# Patient Record
Sex: FEMALE | Race: WHITE | NOT HISPANIC OR LATINO
[De-identification: names, ages, dates, MRNs, and addresses within clinical notes are randomized per-mention and may not be internally consistent; named-entity substitution may affect disease eponyms.]

---

## 2017-12-29 ENCOUNTER — RX ONLY (RX ONLY)
Age: 72
End: 2017-12-29

## 2017-12-29 ENCOUNTER — APPOINTMENT (OUTPATIENT)
Dept: URBAN - METROPOLITAN AREA CLINIC 200 | Age: 72
Setting detail: DERMATOLOGY
End: 2017-12-29

## 2017-12-29 DIAGNOSIS — D22 MELANOCYTIC NEVI: ICD-10-CM

## 2017-12-29 DIAGNOSIS — L57.8 OTHER SKIN CHANGES DUE TO CHRONIC EXPOSURE TO NONIONIZING RADIATION: ICD-10-CM

## 2017-12-29 DIAGNOSIS — L40.0 PSORIASIS VULGARIS: ICD-10-CM

## 2017-12-29 PROBLEM — D22.5 MELANOCYTIC NEVI OF TRUNK: Status: ACTIVE | Noted: 2017-12-29

## 2017-12-29 PROCEDURE — OTHER COUNSELING: OTHER

## 2017-12-29 PROCEDURE — 99213 OFFICE O/P EST LOW 20 MIN: CPT

## 2017-12-29 PROCEDURE — OTHER PRESCRIPTION MEDICATION MANAGEMENT: OTHER

## 2017-12-29 RX ORDER — HALOBETASOL PROPIONATE 0.5 MG/G
CREAM TOPICAL
Qty: 1 | Refills: 6 | Status: ERX

## 2017-12-29 ASSESSMENT — LOCATION SIMPLE DESCRIPTION DERM
LOCATION SIMPLE: RIGHT ELBOW
LOCATION SIMPLE: CHEST

## 2017-12-29 ASSESSMENT — LOCATION DETAILED DESCRIPTION DERM
LOCATION DETAILED: UPPER STERNUM
LOCATION DETAILED: RIGHT ELBOW

## 2017-12-29 ASSESSMENT — LOCATION ZONE DERM
LOCATION ZONE: ARM
LOCATION ZONE: TRUNK

## 2019-01-10 ENCOUNTER — APPOINTMENT (OUTPATIENT)
Dept: URBAN - METROPOLITAN AREA CLINIC 200 | Age: 74
Setting detail: DERMATOLOGY
End: 2019-01-11

## 2019-01-10 DIAGNOSIS — L82.1 OTHER SEBORRHEIC KERATOSIS: ICD-10-CM

## 2019-01-10 DIAGNOSIS — L57.8 OTHER SKIN CHANGES DUE TO CHRONIC EXPOSURE TO NONIONIZING RADIATION: ICD-10-CM

## 2019-01-10 DIAGNOSIS — L20.84 INTRINSIC (ALLERGIC) ECZEMA: ICD-10-CM

## 2019-01-10 DIAGNOSIS — D18.0 HEMANGIOMA: ICD-10-CM

## 2019-01-10 PROBLEM — D18.01 HEMANGIOMA OF SKIN AND SUBCUTANEOUS TISSUE: Status: ACTIVE | Noted: 2019-01-10

## 2019-01-10 PROBLEM — J30.1 ALLERGIC RHINITIS DUE TO POLLEN: Status: ACTIVE | Noted: 2019-01-10

## 2019-01-10 PROCEDURE — OTHER MIPS QUALITY: OTHER

## 2019-01-10 PROCEDURE — 99213 OFFICE O/P EST LOW 20 MIN: CPT

## 2019-01-10 PROCEDURE — OTHER PRESCRIPTION: OTHER

## 2019-01-10 PROCEDURE — OTHER COUNSELING: OTHER

## 2019-01-10 PROCEDURE — OTHER REASSURANCE: OTHER

## 2019-01-10 RX ORDER — TRIAMCINOLONE ACETONIDE 1 MG/G
CREAM TOPICAL BID
Qty: 1 | Refills: 8 | Status: ERX

## 2019-01-10 ASSESSMENT — LOCATION SIMPLE DESCRIPTION DERM
LOCATION SIMPLE: ABDOMEN
LOCATION SIMPLE: RIGHT LOWER BACK
LOCATION SIMPLE: CHEST
LOCATION SIMPLE: LEFT BREAST

## 2019-01-10 ASSESSMENT — LOCATION DETAILED DESCRIPTION DERM
LOCATION DETAILED: RIGHT RIB CAGE
LOCATION DETAILED: PERIUMBILICAL SKIN
LOCATION DETAILED: LEFT MEDIAL BREAST 11-12:00 REGION
LOCATION DETAILED: RIGHT SUPERIOR MEDIAL MIDBACK
LOCATION DETAILED: LEFT MEDIAL SUPERIOR CHEST

## 2019-01-10 ASSESSMENT — SEVERITY ASSESSMENT: SEVERITY: MILD

## 2019-01-10 ASSESSMENT — LOCATION ZONE DERM: LOCATION ZONE: TRUNK

## 2019-01-10 ASSESSMENT — BSA RASH: BSA RASH: 5

## 2020-02-11 ENCOUNTER — APPOINTMENT (OUTPATIENT)
Dept: URBAN - METROPOLITAN AREA CLINIC 200 | Age: 75
Setting detail: DERMATOLOGY
End: 2020-02-21

## 2020-02-11 DIAGNOSIS — L84 CORNS AND CALLOSITIES: ICD-10-CM

## 2020-02-11 DIAGNOSIS — L90.5 SCAR CONDITIONS AND FIBROSIS OF SKIN: ICD-10-CM

## 2020-02-11 DIAGNOSIS — L20.84 INTRINSIC (ALLERGIC) ECZEMA: ICD-10-CM

## 2020-02-11 DIAGNOSIS — L57.8 OTHER SKIN CHANGES DUE TO CHRONIC EXPOSURE TO NONIONIZING RADIATION: ICD-10-CM

## 2020-02-11 PROCEDURE — OTHER MIPS QUALITY: OTHER

## 2020-02-11 PROCEDURE — OTHER PRESCRIPTION MEDICATION MANAGEMENT: OTHER

## 2020-02-11 PROCEDURE — 99213 OFFICE O/P EST LOW 20 MIN: CPT

## 2020-02-11 PROCEDURE — OTHER PRESCRIPTION SAMPLES PROVIDED: OTHER

## 2020-02-11 PROCEDURE — OTHER COUNSELING: OTHER

## 2020-02-11 PROCEDURE — OTHER REASSURANCE: OTHER

## 2020-02-11 ASSESSMENT — LOCATION DETAILED DESCRIPTION DERM
LOCATION DETAILED: RIGHT ANTERIOR PROXIMAL UPPER ARM
LOCATION DETAILED: LEFT LATERAL ABDOMEN
LOCATION DETAILED: LEFT VENTRAL DISTAL FOREARM
LOCATION DETAILED: LEFT LATERAL DORSAL WRIST
LOCATION DETAILED: RIGHT ELBOW
LOCATION DETAILED: LEFT ELBOW
LOCATION DETAILED: LEFT MEDIAL SUPERIOR CHEST
LOCATION DETAILED: INFERIOR THORACIC SPINE
LOCATION DETAILED: LEFT ANTERIOR PROXIMAL UPPER ARM
LOCATION DETAILED: LEFT GREAT TOE
LOCATION DETAILED: RIGHT VENTRAL PROXIMAL FOREARM

## 2020-02-11 ASSESSMENT — LOCATION SIMPLE DESCRIPTION DERM
LOCATION SIMPLE: ABDOMEN
LOCATION SIMPLE: LEFT ELBOW
LOCATION SIMPLE: RIGHT FOREARM
LOCATION SIMPLE: RIGHT ELBOW
LOCATION SIMPLE: UPPER BACK
LOCATION SIMPLE: LEFT GREAT TOE
LOCATION SIMPLE: RIGHT UPPER ARM
LOCATION SIMPLE: LEFT FOREARM
LOCATION SIMPLE: LEFT WRIST
LOCATION SIMPLE: LEFT UPPER ARM
LOCATION SIMPLE: CHEST

## 2020-02-11 ASSESSMENT — LOCATION ZONE DERM
LOCATION ZONE: ARM
LOCATION ZONE: TRUNK
LOCATION ZONE: TOE

## 2020-02-11 NOTE — PROCEDURE: REASSURANCE
Additional Notes (Optional): Recommended orthopedic surgeon
Hide Additional Notes?: No
Detail Level: Detailed

## 2020-02-11 NOTE — PROCEDURE: PRESCRIPTION MEDICATION MANAGEMENT
Detail Level: Detailed
Continue Regimen: Triamcinolone cream to eczema on body
Render In Strict Bullet Format?: No

## 2021-03-25 ENCOUNTER — RX ONLY (RX ONLY)
Age: 76
End: 2021-03-25

## 2021-03-25 ENCOUNTER — APPOINTMENT (OUTPATIENT)
Dept: URBAN - METROPOLITAN AREA CLINIC 200 | Age: 76
Setting detail: DERMATOLOGY
End: 2021-04-01

## 2021-03-25 DIAGNOSIS — L20.84 INTRINSIC (ALLERGIC) ECZEMA: ICD-10-CM

## 2021-03-25 DIAGNOSIS — L57.8 OTHER SKIN CHANGES DUE TO CHRONIC EXPOSURE TO NONIONIZING RADIATION: ICD-10-CM

## 2021-03-25 PROCEDURE — OTHER MIPS QUALITY: OTHER

## 2021-03-25 PROCEDURE — OTHER PRESCRIPTION MEDICATION MANAGEMENT: OTHER

## 2021-03-25 PROCEDURE — 99213 OFFICE O/P EST LOW 20 MIN: CPT

## 2021-03-25 PROCEDURE — OTHER COUNSELING: OTHER

## 2021-03-25 RX ORDER — HALOBETASOL PROPIONATE 0.5 MG/G
CREAM TOPICAL
Qty: 1 | Refills: 6 | Status: ERX | COMMUNITY
Start: 2021-03-25

## 2021-03-25 ASSESSMENT — LOCATION DETAILED DESCRIPTION DERM
LOCATION DETAILED: LEFT MEDIAL SUPERIOR CHEST
LOCATION DETAILED: RIGHT DISTAL LATERAL POSTERIOR UPPER ARM
LOCATION DETAILED: RIGHT PROXIMAL DORSAL FOREARM
LOCATION DETAILED: LEFT PROXIMAL DORSAL FOREARM

## 2021-03-25 ASSESSMENT — SEVERITY ASSESSMENT 2020: SEVERITY 2020: MILD

## 2021-03-25 ASSESSMENT — LOCATION SIMPLE DESCRIPTION DERM
LOCATION SIMPLE: CHEST
LOCATION SIMPLE: RIGHT FOREARM
LOCATION SIMPLE: RIGHT POSTERIOR UPPER ARM
LOCATION SIMPLE: LEFT FOREARM

## 2021-03-25 ASSESSMENT — LOCATION ZONE DERM
LOCATION ZONE: TRUNK
LOCATION ZONE: ARM

## 2021-06-24 ENCOUNTER — APPOINTMENT (OUTPATIENT)
Dept: URBAN - METROPOLITAN AREA CLINIC 200 | Age: 76
Setting detail: DERMATOLOGY
End: 2021-06-28

## 2021-06-24 DIAGNOSIS — L82.1 OTHER SEBORRHEIC KERATOSIS: ICD-10-CM

## 2021-06-24 PROCEDURE — OTHER REASSURANCE: OTHER

## 2021-06-24 PROCEDURE — OTHER COUNSELING: OTHER

## 2021-06-24 PROCEDURE — 99212 OFFICE O/P EST SF 10 MIN: CPT

## 2021-06-24 ASSESSMENT — LOCATION SIMPLE DESCRIPTION DERM
LOCATION SIMPLE: RIGHT UPPER BACK
LOCATION SIMPLE: RIGHT LOWER BACK

## 2021-06-24 ASSESSMENT — LOCATION DETAILED DESCRIPTION DERM
LOCATION DETAILED: RIGHT INFERIOR MEDIAL UPPER BACK
LOCATION DETAILED: RIGHT SUPERIOR MEDIAL MIDBACK

## 2021-06-24 ASSESSMENT — LOCATION ZONE DERM: LOCATION ZONE: TRUNK

## 2022-04-12 ENCOUNTER — APPOINTMENT (OUTPATIENT)
Dept: URBAN - METROPOLITAN AREA CLINIC 200 | Age: 77
Setting detail: DERMATOLOGY
End: 2022-04-20

## 2022-04-12 DIAGNOSIS — L57.8 OTHER SKIN CHANGES DUE TO CHRONIC EXPOSURE TO NONIONIZING RADIATION: ICD-10-CM

## 2022-04-12 DIAGNOSIS — L57.0 ACTINIC KERATOSIS: ICD-10-CM

## 2022-04-12 DIAGNOSIS — Z11.52 ENCOUNTER FOR SCREENING FOR COVID-19: ICD-10-CM

## 2022-04-12 PROBLEM — E03.9 HYPOTHYROIDISM, UNSPECIFIED: Status: ACTIVE | Noted: 2022-04-12

## 2022-04-12 PROCEDURE — 17000 DESTRUCT PREMALG LESION: CPT

## 2022-04-12 PROCEDURE — OTHER COUNSELING: OTHER

## 2022-04-12 PROCEDURE — OTHER SUNSCREEN RECOMMENDATIONS: OTHER

## 2022-04-12 PROCEDURE — OTHER SCREENING FOR COVID-19: OTHER

## 2022-04-12 PROCEDURE — OTHER LIQUID NITROGEN: OTHER

## 2022-04-12 PROCEDURE — 99213 OFFICE O/P EST LOW 20 MIN: CPT | Mod: 25

## 2022-04-12 ASSESSMENT — LOCATION SIMPLE DESCRIPTION DERM
LOCATION SIMPLE: LEFT UPPER ARM
LOCATION SIMPLE: ABDOMEN

## 2022-04-12 ASSESSMENT — LOCATION ZONE DERM
LOCATION ZONE: ARM
LOCATION ZONE: TRUNK

## 2022-04-12 ASSESSMENT — PAIN INTENSITY VAS: HOW INTENSE IS YOUR PAIN 0 BEING NO PAIN, 10 BEING THE MOST SEVERE PAIN POSSIBLE?: NO PAIN

## 2022-04-12 ASSESSMENT — LOCATION DETAILED DESCRIPTION DERM
LOCATION DETAILED: PERIUMBILICAL SKIN
LOCATION DETAILED: LEFT LATERAL PROXIMAL UPPER ARM

## 2022-04-12 NOTE — PROCEDURE: LIQUID NITROGEN
Show Aperture Variable?: Yes
Consent: The patient's consent was obtained including but not limited to risks of crusting, scabbing, blistering, scarring, darker or lighter pigmentary change, recurrence, incomplete removal and infection.
Post-Care Instructions: I reviewed with the patient in detail post-care instructions. Patient is to wear sunprotection, and avoid picking at any of the treated lesions. Pt may apply Vaseline to crusted or scabbing areas.
Number Of Freeze-Thaw Cycles: 1 freeze-thaw cycle
Duration Of Freeze Thaw-Cycle (Seconds): 1
Render Note In Bullet Format When Appropriate: No
Detail Level: Detailed

## 2022-11-17 NOTE — HPI: EVALUATION OF SKIN LESION(S)
Hpi Title: Evaluation of Skin Lesions
S/p CABG in 2006 and PCI in 2013  - last LHC was in 2018, showed stable disease. There was no intervention indicated  - Per patient, last stress test 1 year ago was normal  - c/w home aspirin 81mg qPM  - c/w home simvastatin 40mg qPM  - c/w home metoprolol succinate 100mg daily  - pt takes olmesartan at home but was unsure of the dose. For now will c/w losartan 50mg daily  - please clarify home dose of olmesartan in the AM

## 2023-05-09 ENCOUNTER — APPOINTMENT (OUTPATIENT)
Dept: URBAN - METROPOLITAN AREA CLINIC 203 | Age: 78
Setting detail: DERMATOLOGY
End: 2023-05-16

## 2023-05-09 DIAGNOSIS — L57.8 OTHER SKIN CHANGES DUE TO CHRONIC EXPOSURE TO NONIONIZING RADIATION: ICD-10-CM

## 2023-05-09 DIAGNOSIS — L20.84 INTRINSIC (ALLERGIC) ECZEMA: ICD-10-CM

## 2023-05-09 PROCEDURE — OTHER SUNSCREEN RECOMMENDATIONS: OTHER

## 2023-05-09 PROCEDURE — OTHER PRESCRIPTION MEDICATION MANAGEMENT: OTHER

## 2023-05-09 PROCEDURE — OTHER COUNSELING: OTHER

## 2023-05-09 PROCEDURE — OTHER PRESCRIPTION: OTHER

## 2023-05-09 PROCEDURE — 99213 OFFICE O/P EST LOW 20 MIN: CPT

## 2023-05-09 RX ORDER — CLOBETASOL PROPIONATE 0.5 MG/G
CREAM TOPICAL BID
Qty: 15 | Refills: 3 | Status: ERX | COMMUNITY
Start: 2023-05-09

## 2023-05-09 ASSESSMENT — LOCATION DETAILED DESCRIPTION DERM
LOCATION DETAILED: LEFT MEDIAL BREAST 11-12:00 REGION
LOCATION DETAILED: SUPERIOR THORACIC SPINE
LOCATION DETAILED: LEFT MEDIAL BREAST 10-11:00 REGION

## 2023-05-09 ASSESSMENT — LOCATION ZONE DERM: LOCATION ZONE: TRUNK

## 2023-05-09 ASSESSMENT — LOCATION SIMPLE DESCRIPTION DERM
LOCATION SIMPLE: UPPER BACK
LOCATION SIMPLE: LEFT BREAST

## 2024-01-29 ENCOUNTER — TRANSCRIBE ORDERS (OUTPATIENT)
Dept: SCHEDULING | Age: 79
End: 2024-01-29

## 2024-01-29 DIAGNOSIS — M75.121 COMPLETE ROTATOR CUFF TEAR OR RUPTURE OF RIGHT SHOULDER, NOT SPECIFIED AS TRAUMATIC: ICD-10-CM

## 2024-01-29 DIAGNOSIS — M19.011 PRIMARY OSTEOARTHRITIS, RIGHT SHOULDER: Primary | ICD-10-CM

## 2024-02-12 ENCOUNTER — HOSPITAL ENCOUNTER (OUTPATIENT)
Dept: RADIOLOGY | Facility: HOSPITAL | Age: 79
Discharge: HOME | End: 2024-02-12
Attending: ORTHOPAEDIC SURGERY
Payer: MEDICARE

## 2024-02-12 DIAGNOSIS — M75.121 COMPLETE ROTATOR CUFF TEAR OR RUPTURE OF RIGHT SHOULDER, NOT SPECIFIED AS TRAUMATIC: ICD-10-CM

## 2024-02-12 DIAGNOSIS — M19.011 PRIMARY OSTEOARTHRITIS, RIGHT SHOULDER: ICD-10-CM

## 2024-03-12 ENCOUNTER — PRE-ADMISSION TESTING (OUTPATIENT)
Dept: PREADMISSION TESTING | Age: 79
End: 2024-03-12
Payer: MEDICARE

## 2024-03-12 VITALS — WEIGHT: 220 LBS | BODY MASS INDEX: 40.48 KG/M2 | HEIGHT: 62 IN

## 2024-03-12 RX ORDER — LEVOTHYROXINE SODIUM 175 UG/1
175 TABLET ORAL DAILY
COMMUNITY
Start: 2024-02-01

## 2024-03-12 RX ORDER — MINERAL OIL
180 ENEMA (ML) RECTAL DAILY
COMMUNITY

## 2024-03-12 RX ORDER — ESCITALOPRAM OXALATE 10 MG/1
10 TABLET ORAL DAILY
COMMUNITY
Start: 2024-02-17

## 2024-03-12 ASSESSMENT — PAIN SCALES - GENERAL: PAINLEVEL: 2

## 2024-03-12 NOTE — PRE-PROCEDURE INSTRUCTIONS
[facility]  Patient Education Preoperative Showers    Good hygiene, such as frequent handwashing and daily skin cleansing, promotes good health. Daily skin cleansing helps remove germs that may cause infections. The following instructions should be followed to help reduce germs on your skin prior to your surgical procedure.     • Bactoshield/Hibiclens CHG 4% is an antiseptic soap. The active ingredient is chlorhexidine gluconate. Do not use this product if you are allergic to chlorhexidine gluconate.  • The NIGHT before and the MORNING of your procedure, shower or bathe with Bactoshield. This product should replace your regular soap used for cleansing most of your body surfaces. Bactoshield should not be used on your head or face; keep out of your eyes, ears and mouth.  • If you plan to wash your hair, do so with your regular shampoo. Then, rinse the hair and your body thoroughly to remove any shampoo residue.  • Wash your face with regular soap and water only.  • Wash your genital area with soap and water only.  • Thoroughly rinse your body with warm water from the neck down.  • Apply the minimum amount of Bactoshield to cover the skin. Use this product as you would any liquid soap. Leave this on for 2 minutes. NOTE- Bactoshield DOES NOT LATHER like normal soap.   • Wash the skin gently and rinse thoroughly with warm water. You do not need to scrub the skin to remove germs.  • Do not use regular soap after you have applied and rinsed the Bactoshield.  Change into clean clothes after each shower.   • Do not apply any lotions, powders, or perfumes to the body areas that have been cleansed with Bactoshield.  • No use of hair removal products or shaving at or near the surgical site 48 hours before your procedure. (72 hours for cardiac patients.)  • For those having perineal surgeries (i.e.: vaginal, rectal or cystoscopy) - please use Dial soap.

## 2024-03-12 NOTE — PRE-PROCEDURE INSTRUCTIONS
1.       Admissions will call you with your arrival time on 3/26 (day prior to surgery) between 2pm-4pm.  For questions about your arrival time, please call [phone].    2.       On the day of your procedure [facility] [place]. Please arrive through the [facility].  If you are parking in the [facility], come to the ground floor of the garage and follow signs to the [facility].  After being screened, please report to either the Outpatient Registration for Pre-Admission Testing or to the Surgery Registration Desk.    3. Please follow the following fasting guidelines:     No solid food EIGHT HOURS prior to arrival time on day of surgery.  6 ounces of clear liquids, meaning water or PLAIN black coffee WITHOUT any milk, cream, sugar, or sweetener are permitted up to TWO HOURS prior to arrival at the hospital.    4. Please take ONLY the following medications with a sip of water on the morning of your procedure: (populate names and/or NONE)  Lexapro, Synthroid    5. Other Instructions: You may brush your teeth the morning of the procedure. Rinse and spit, do not swallow.  Bring a list of your medications with dosages.  Use surgical wash as directed.     6. If you develop a cold, cough, fever, rash, or other symptom prior to the day of the procedure, please report it to your physician immediately.    7. If you need to cancel the procedure for any reason, please contact your physician.    8. Make arrangements to have safe transportation home accompanied by a responsible adult. If you have not arranged safe transportation home, your surgery will be cancelled. Safe transportation may include private vehicle, ride-share service, taxi and public transportation when accompanied by a responsible adult who will assist you home. A responsible adult is someone known to you and does not include the taxi, ride-share or public transit drive transporting  you.    9.  If it is medically necessary for you to have a longer stay, you will be informed as soon as the decision is made.    10. Only bring essential items to the hospital.  Do not wear or bring anything of value to the hospital including jewelry of any kind, money, or wallet. Do not wear make-up or contact lenses.  DO NOT BRING MEDICATIONS FROM HOME unless instructed to do so. DO bring your hearing aids, glasses, and a case    11. No lotion, creams, powders, or oils on skin the morning of procedure     12. Dress in comfortable clothes.    13.  If instructed, please bring a copy of your Advanced Directive (Living Will/Durable Power of ) on the day of your procedure.     14. Ensuring your safety at all times is a very important part of our [facility] Culture of Safety. After having surgery and sedation, you are at risk for falling and balance issues. Although you may feel awake, the effects of the medication can last up to 24 hours after anesthesia. If you need to use the bathroom during your recovery period, nursing staff will escort you there and stay with you to ensure your safety.    15. Refrain from drinking alcohol and smoking cigarettes for 24 hours prior to surgery.    16. Shower with antibacterial soap (Dial) the night before and morning of your procedure.  If your procedure indicates the need for CHG antiseptic wash (Bactoshield or Hibiclens), please use this instead and follow instructions as discussed at the time of your Pre-Admission Testing phone interview or visit.    Above instructions reviewed with patient and patient acknowledges understanding.    Form explained by: Cary Mitchell RN

## 2024-03-15 ENCOUNTER — TRANSCRIBE ORDERS (OUTPATIENT)
Dept: LAB | Facility: HOSPITAL | Age: 79
End: 2024-03-15

## 2024-03-15 DIAGNOSIS — R94.120 ABNORMAL AUDITORY FUNCTION STUDY: ICD-10-CM

## 2024-03-15 DIAGNOSIS — M19.011 PRIMARY OSTEOARTHRITIS, RIGHT SHOULDER: ICD-10-CM

## 2024-03-15 DIAGNOSIS — M75.121 COMPLETE ROTATOR CUFF TEAR OR RUPTURE OF RIGHT SHOULDER, NOT SPECIFIED AS TRAUMATIC: Primary | ICD-10-CM

## 2024-03-18 ENCOUNTER — HOSPITAL ENCOUNTER (OUTPATIENT)
Dept: CARDIOLOGY | Facility: HOSPITAL | Age: 79
Discharge: HOME | End: 2024-03-18
Attending: ORTHOPAEDIC SURGERY
Payer: MEDICARE

## 2024-03-18 ENCOUNTER — PRE-ADMISSION TESTING (OUTPATIENT)
Dept: PREADMISSION TESTING | Facility: HOSPITAL | Age: 79
End: 2024-03-18
Attending: ORTHOPAEDIC SURGERY
Payer: MEDICARE

## 2024-03-18 VITALS
WEIGHT: 220 LBS | TEMPERATURE: 98.5 F | DIASTOLIC BLOOD PRESSURE: 72 MMHG | OXYGEN SATURATION: 96 % | SYSTOLIC BLOOD PRESSURE: 130 MMHG | HEART RATE: 68 BPM | RESPIRATION RATE: 16 BRPM | BODY MASS INDEX: 40.48 KG/M2 | HEIGHT: 62 IN

## 2024-03-18 DIAGNOSIS — R94.120 ABNORMAL AUDITORY FUNCTION STUDY: ICD-10-CM

## 2024-03-18 DIAGNOSIS — F41.9 ANXIETY: ICD-10-CM

## 2024-03-18 DIAGNOSIS — D64.9 ANEMIA, UNSPECIFIED TYPE: ICD-10-CM

## 2024-03-18 DIAGNOSIS — M75.121 COMPLETE ROTATOR CUFF TEAR OR RUPTURE OF RIGHT SHOULDER, NOT SPECIFIED AS TRAUMATIC: ICD-10-CM

## 2024-03-18 DIAGNOSIS — M19.011 PRIMARY OSTEOARTHRITIS, RIGHT SHOULDER: ICD-10-CM

## 2024-03-18 DIAGNOSIS — E03.9 HYPOTHYROIDISM, UNSPECIFIED TYPE: ICD-10-CM

## 2024-03-18 DIAGNOSIS — Z01.818 ENCOUNTER FOR PRE-OPERATIVE EXAMINATION: ICD-10-CM

## 2024-03-18 LAB
ABO + RH BLD: NORMAL
ANION GAP SERPL CALC-SCNC: 7 MEQ/L (ref 3–15)
ATRIAL RATE: 74
BLD GP AB SCN SERPL QL: NEGATIVE
BLOOD BANK CMNT PATIENT-IMP: NORMAL
BUN SERPL-MCNC: 18 MG/DL (ref 7–25)
CALCIUM SERPL-MCNC: 9.1 MG/DL (ref 8.6–10.3)
CHLORIDE SERPL-SCNC: 104 MEQ/L (ref 98–107)
CO2 SERPL-SCNC: 27 MEQ/L (ref 21–31)
CREAT SERPL-MCNC: 0.6 MG/DL (ref 0.6–1.2)
D AG BLD QL: POSITIVE
EGFRCR SERPLBLD CKD-EPI 2021: >60 ML/MIN/1.73M*2
ERYTHROCYTE [DISTWIDTH] IN BLOOD BY AUTOMATED COUNT: 15.6 % (ref 11.7–14.4)
GLUCOSE SERPL-MCNC: 94 MG/DL (ref 70–99)
HCT VFR BLD AUTO: 35 % (ref 35–45)
HGB BLD-MCNC: 11.1 G/DL (ref 11.8–15.7)
LABORATORY COMMENT REPORT: NORMAL
MCH RBC QN AUTO: 25.9 PG (ref 28–33.2)
MCHC RBC AUTO-ENTMCNC: 31.7 G/DL (ref 32.2–35.5)
MCV RBC AUTO: 81.8 FL (ref 83–98)
P AXIS: 67
PDW BLD AUTO: 9.6 FL (ref 9.4–12.3)
PLATELET # BLD AUTO: 241 K/UL (ref 150–369)
POTASSIUM SERPL-SCNC: 4.3 MEQ/L (ref 3.5–5.1)
PR INTERVAL: 162
QRS DURATION: 84
QT INTERVAL: 400
QTC CALCULATION(BAZETT): 444
R AXIS: 13
RBC # BLD AUTO: 4.28 M/UL (ref 3.93–5.22)
SODIUM SERPL-SCNC: 138 MEQ/L (ref 136–145)
SPECIMEN EXP DATE BLD: NORMAL
T WAVE AXIS: 54
VENTRICULAR RATE: 74
WBC # BLD AUTO: 4.63 K/UL (ref 3.8–10.5)

## 2024-03-18 PROCEDURE — 80048 BASIC METABOLIC PNL TOTAL CA: CPT

## 2024-03-18 PROCEDURE — 86850 RBC ANTIBODY SCREEN: CPT

## 2024-03-18 PROCEDURE — 36415 COLL VENOUS BLD VENIPUNCTURE: CPT

## 2024-03-18 PROCEDURE — 99204 OFFICE O/P NEW MOD 45 MIN: CPT | Performed by: HOSPITALIST

## 2024-03-18 PROCEDURE — 93005 ELECTROCARDIOGRAM TRACING: CPT

## 2024-03-18 PROCEDURE — 85027 COMPLETE CBC AUTOMATED: CPT

## 2024-03-18 NOTE — ASSESSMENT & PLAN NOTE
Scheduled to have Right Reverse Total Shoulder Arthroplasty by Dr Neri on 3/27/24  Complaining of right shoulder pain for the last 10 months  She injured her shoulder after a mechanical fall while lawn moving  She tried all nonoperative measures without much improvement  She denies any exertional chest pain or sob and her exercise tolerance is > 4 mets  She denies any history of cad/mi/stroke  She denies any bowel or bladder disturbance  She denies any history of dvt/pe  She denies any snoring  EKG shows normal sinus rhythm with sinus arrhythmia

## 2024-03-18 NOTE — H&P (VIEW-ONLY)
[facility] Medicine Service -  Pre-Operative Consultation       Patient Name: Iza Crenshaw  Referring Surgeon: Dr Neri    Reason for Referral: Pre-Operative Evaluation  Surgical Procedure: Right Reverse Shoulder Arthroplasty  Operative Date: 3/27/24  Other Providers:      PCP: Marsha Triplett MD          HISTORY OF PRESENT ILLNESS      Iza Crenshaw is a 78 y.o. female presenting today to the [facility] at [place] for pre-operative evaluation prior to planned surgery.    Complaining of right shoulder pain for the last 10 months  She injured her shoulder after a mechanical fall while lawn moving  She tried all nonoperative measures without much improvement  She denies any exertional chest pain or sob and her exercise tolerance is > 4 mets  She denies any history of cad/mi/stroke      In regards to medical history:  Hypothyroidsm  Anxiety    The patient denies any current or recent chest pain or pressure, dyspnea, cough, sputum, fevers, chills, abdominal pain, nausea, vomiting, diarrhea or other symptoms.     Functionally, the patient is able to ascend a flight or so of stairs with no dyspnea or chest pain.     The patient denies, on specific questioning, the following:  No history of MI, arrhythmia,or CHF.  No history of LISA.  No history of DVT/PE.  No history of COPD.  No history of CVA.  No history of DM.   No history of CKD.     PAST MEDICAL AND SURGICAL HISTORY      Past Medical History:   Diagnosis Date   • Anxiety 1995   • Depression    • Hypothyroidism 1995   • Torn rotator cuff     right       Past Surgical History:   Procedure Laterality Date   • APPENDECTOMY     • CHOLECYSTECTOMY     • GASTRIC BYPASS  2007   • REPLACEMENT TOTAL KNEE Bilateral     Dr. Lawrence 6 months apart       MEDICATIONS        Current Outpatient Medications:   •  escitalopram (LEXAPRO) 10 mg tablet, Take 10 mg by mouth daily., Disp: , Rfl:   •  fexofenadine (ALLEGRA) 180 mg  "tablet, Take 180 mg by mouth daily., Disp: , Rfl:   •  SYNTHROID 175 mcg tablet, Take 175 mcg by mouth daily., Disp: , Rfl:     ALLERGIES      Pollen extracts    FAMILY HISTORY      family history includes Ovarian cancer in her biological sister.    Denies any prior known family history of DVTs/PEs/clotting disorder    SOCIAL HISTORY      Social History     Tobacco Use   • Smoking status: Never   • Smokeless tobacco: Never   Vaping Use   • Vaping Use: Never used   Substance Use Topics   • Alcohol use: Never   • Drug use: Never       REVIEW OF SYSTEMS      All other systems reviewed and negative except as noted in HPI    PHYSICAL EXAMINATION      Visit Vitals  /72 (BP Location: Right upper arm, Patient Position: Sitting)   Pulse 68   Temp 36.9 °C (98.5 °F) (Temporal)   Resp 16   Ht 1.575 m (5' 2\")   Wt 99.8 kg (220 lb)   SpO2 96%   BMI 40.24 kg/m²     Body mass index is 40.24 kg/m².    Physical Exam  General appearance: alert, appears stated age, cooperative, non-toxic  Head: normocephalic, without obvious abnormality, atraumatic  Eyes: conjunctivae clear. PERRL, EOMI's intact.  Lungs: clear to auscultation bilaterally   Heart: regular rate and rhythm, S1, S2 normal,   Abdomen: Nondistended, +BS, soft, non-tender, no masses palpable   Extremities: right shoulder rom limited secondary to pain  Pulses: 2+ and symmetric B/L DP  Neurologic: Alert and oriented X 3, no focal deficits  Skin: intact, no rashes or lesions      LABS / EKG        Labs      Lab Results   Component Value Date     03/18/2024    K 4.3 03/18/2024     03/18/2024    BUN 18 03/18/2024    CREATININE 0.6 03/18/2024    WBC 4.63 03/18/2024    HGB 11.1 (L) 03/18/2024    HCT 35.0 03/18/2024     03/18/2024    ALT 29 08/23/2017    AST 61 (H) 08/23/2017         ECG/Telemetry  I have independently reviewed the ECG. Significant findings include Normal sinus rhythm with sinus arrhythmia.    ASSESSMENT AND PLAN         Encounter for " pre-operative examination  Scheduled to have Right Reverse Total Shoulder Arthroplasty by Dr Neri on 3/27/24  Complaining of right shoulder pain for the last 10 months  She injured her shoulder after a mechanical fall while lawn moving  She tried all nonoperative measures without much improvement  She denies any exertional chest pain or sob and her exercise tolerance is > 4 mets  She denies any history of cad/mi/stroke  She denies any bowel or bladder disturbance  She denies any history of dvt/pe  She denies any snoring  EKG shows normal sinus rhythm with sinus arrhythmia    Hypothyroidism  Continue Levothyroxine    Anxiety  Continue Lexapro    Anemia  Hemoglobin is 11.1-follow-up postop       In regards to perioperative cardiac risk:  Regarding perioperative cardiovascular risk:  The patient has the following risk factors: none.  This correlates to a rCRI score of 0 with perioperative cardiac event risk of 0.4%.  Reverse Shoulder Arthroplasty is an intermediate risk procedure and she is at acceptable risk for surgery    Further comments:  Resume supplements when OK with surgical team.  I would encourage incentive spirometry to assist with minimizing je-operative pulmonary risk.  DVT prophylaxis and timing of such per the discretion of the surgeon.     Please do not hesitate to contact [facility] during the upcoming hospitalization with any questions or concerns.     Matheus Herrera MD  3/18/2024

## 2024-03-18 NOTE — CONSULTS
[facility] Medicine Service -  Pre-Operative Consultation       Patient Name: Iza Crenshaw  Referring Surgeon: Dr Neri    Reason for Referral: Pre-Operative Evaluation  Surgical Procedure: Right Reverse Shoulder Arthroplasty  Operative Date: 3/27/24  Other Providers:      PCP: Marsha Triplett MD          HISTORY OF PRESENT ILLNESS      Iza Crenshaw is a 78 y.o. female presenting today to the [facility] at [place] for pre-operative evaluation prior to planned surgery.    Complaining of right shoulder pain for the last 10 months  She injured her shoulder after a mechanical fall while lawn moving  She tried all nonoperative measures without much improvement  She denies any exertional chest pain or sob and her exercise tolerance is > 4 mets  She denies any history of cad/mi/stroke      In regards to medical history:  Hypothyroidsm  Anxiety    The patient denies any current or recent chest pain or pressure, dyspnea, cough, sputum, fevers, chills, abdominal pain, nausea, vomiting, diarrhea or other symptoms.     Functionally, the patient is able to ascend a flight or so of stairs with no dyspnea or chest pain.     The patient denies, on specific questioning, the following:  No history of MI, arrhythmia,or CHF.  No history of LISA.  No history of DVT/PE.  No history of COPD.  No history of CVA.  No history of DM.   No history of CKD.     PAST MEDICAL AND SURGICAL HISTORY      Past Medical History:   Diagnosis Date   • Anxiety 1995   • Depression    • Hypothyroidism 1995   • Torn rotator cuff     right       Past Surgical History:   Procedure Laterality Date   • APPENDECTOMY     • CHOLECYSTECTOMY     • GASTRIC BYPASS  2007   • REPLACEMENT TOTAL KNEE Bilateral     Dr. Lawrence 6 months apart       MEDICATIONS        Current Outpatient Medications:   •  escitalopram (LEXAPRO) 10 mg tablet, Take 10 mg by mouth daily., Disp: , Rfl:   •  fexofenadine (ALLEGRA) 180 mg  "tablet, Take 180 mg by mouth daily., Disp: , Rfl:   •  SYNTHROID 175 mcg tablet, Take 175 mcg by mouth daily., Disp: , Rfl:     ALLERGIES      Pollen extracts    FAMILY HISTORY      family history includes Ovarian cancer in her biological sister.    Denies any prior known family history of DVTs/PEs/clotting disorder    SOCIAL HISTORY      Social History     Tobacco Use   • Smoking status: Never   • Smokeless tobacco: Never   Vaping Use   • Vaping Use: Never used   Substance Use Topics   • Alcohol use: Never   • Drug use: Never       REVIEW OF SYSTEMS      All other systems reviewed and negative except as noted in HPI    PHYSICAL EXAMINATION      Visit Vitals  /72 (BP Location: Right upper arm, Patient Position: Sitting)   Pulse 68   Temp 36.9 °C (98.5 °F) (Temporal)   Resp 16   Ht 1.575 m (5' 2\")   Wt 99.8 kg (220 lb)   SpO2 96%   BMI 40.24 kg/m²     Body mass index is 40.24 kg/m².    Physical Exam  General appearance: alert, appears stated age, cooperative, non-toxic  Head: normocephalic, without obvious abnormality, atraumatic  Eyes: conjunctivae clear. PERRL, EOMI's intact.  Lungs: clear to auscultation bilaterally   Heart: regular rate and rhythm, S1, S2 normal,   Abdomen: Nondistended, +BS, soft, non-tender, no masses palpable   Extremities: right shoulder rom limited secondary to pain  Pulses: 2+ and symmetric B/L DP  Neurologic: Alert and oriented X 3, no focal deficits  Skin: intact, no rashes or lesions      LABS / EKG        Labs      Lab Results   Component Value Date     03/18/2024    K 4.3 03/18/2024     03/18/2024    BUN 18 03/18/2024    CREATININE 0.6 03/18/2024    WBC 4.63 03/18/2024    HGB 11.1 (L) 03/18/2024    HCT 35.0 03/18/2024     03/18/2024    ALT 29 08/23/2017    AST 61 (H) 08/23/2017         ECG/Telemetry  I have independently reviewed the ECG. Significant findings include Normal sinus rhythm with sinus arrhythmia.    ASSESSMENT AND PLAN         Encounter for " pre-operative examination  Scheduled to have Right Reverse Total Shoulder Arthroplasty by Dr Neri on 3/27/24  Complaining of right shoulder pain for the last 10 months  She injured her shoulder after a mechanical fall while lawn moving  She tried all nonoperative measures without much improvement  She denies any exertional chest pain or sob and her exercise tolerance is > 4 mets  She denies any history of cad/mi/stroke  She denies any bowel or bladder disturbance  She denies any history of dvt/pe  She denies any snoring  EKG shows normal sinus rhythm with sinus arrhythmia    Hypothyroidism  Continue Levothyroxine    Anxiety  Continue Lexapro    Anemia  Hemoglobin is 11.1-follow-up postop       In regards to perioperative cardiac risk:  Regarding perioperative cardiovascular risk:  The patient has the following risk factors: none.  This correlates to a rCRI score of 0 with perioperative cardiac event risk of 0.4%.  Reverse Shoulder Arthroplasty is an intermediate risk procedure and she is at acceptable risk for surgery    Further comments:  Resume supplements when OK with surgical team.  I would encourage incentive spirometry to assist with minimizing je-operative pulmonary risk.  DVT prophylaxis and timing of such per the discretion of the surgeon.     Please do not hesitate to contact [facility] during the upcoming hospitalization with any questions or concerns.     Matheus Herrera MD  3/18/2024

## 2024-03-22 NOTE — DISCHARGE INSTRUCTIONS
Procedure name: Reverse Ball and Socket replacement - [place]     Symptoms to call for instructions  The following are CONCERNING SYMPTOMS after a reverse shoulder replacement    PLEASE CALL YOUR DOCTOR IF:                * You have excessive redness of the incision.              * You have drainage for more than 4 days.              * You have a fever greater than 101.5 degrees Fahrenheit.    Activity restrictions instructions  The following are ACTIVITY RESTRICTIONS after a reverse shoulder replacement    * A sling has been provided for you.  * After the first 48 to 72 hours, you may remove the sling to bathe and dress and perform melani ctivities taught to you prior to discharge. Otherwise, the sling should be worn.    Wound care instructions  The following are INCISION CARE instructions after a reverse shoulder replacement           * Use ice on the shoulder intermittently over the first 48 hours after surgery                    (20 minutes on and 20 minutes off).  If you have a beige tape dressing, you may remove it after 2 days.  If you have a clear plastic dressing, it is waterproof and should not be removed  * You may shower immediately with the waterproof dressing. Otherwise after the 5th day.  The incision site can get wet after day 5 but CANNOT be submerged under water until your sutures/staples are removed.  * Do not rub the incision.  Make sure your axilla (armpit) is completely dry after showering.    Additional supplement instructions  In addition,    * Pain medication has been prescribed for you.  Take a stool softener (Colace, Dulcolax or Senakot) if you are using narcotic pain medications.  * Use your medication liberally as directed over the first 48 hours, and then begin to taper the use of your narcotic.  You may take Extra Strength Tylenol or Tylenol in addition to the narcotic pills early in the postoperative period and in place of them while titrating off of the narcotics.  * DO NOT take ANY  nonsteroidal anti-inflammatory pain medications: Advil, Motrin, Ibuprofen, Aleve, Naproxen, or Naprosyn.    * Take one 81mg aspirin once a day for 6 weeks after surgery, unless you have an aspirin sensitivity /allergy or asthma.      * Please call [phone] or [phone] for any problems.  * Please call [phone] to make a follow-up appointment.  You should see the doctor 10-14 days after your surgery.

## 2024-03-26 ENCOUNTER — ANESTHESIA EVENT (OUTPATIENT)
Dept: OPERATING ROOM | Facility: HOSPITAL | Age: 79
Setting detail: HOSPITAL OUTPATIENT SURGERY
End: 2024-03-26
Payer: MEDICARE

## 2024-03-26 NOTE — ANESTHESIA PREPROCEDURE EVALUATION
Relevant Problems   HEMATOLOGY   (+) Anemia      NEUROLOGY   (+) Anxiety      Other   (+) Hypothyroidism       Anesthesia ROS/MED HX      Endo/Other  Body Habitus: Obese       Past Surgical History:   Procedure Laterality Date    APPENDECTOMY      CHOLECYSTECTOMY      GASTRIC BYPASS  2007    REPLACEMENT TOTAL KNEE Bilateral     Dr. Lawrence 6 months apart       Physical Exam    Airway   Mallampati: II   TM distance: >3 FB   Neck ROM: full  Cardiovascular - normal   Rhythm: regular   Rate: normalPulmonary - normal   clear to auscultation  Dental - normal        Anesthesia Plan    Plan: general       3 ASA  Anesthetic plan and risks discussed with: mother  Postop Plan:   Pain Management: interscalene block

## 2024-03-27 ENCOUNTER — APPOINTMENT (OUTPATIENT)
Dept: RADIOLOGY | Facility: HOSPITAL | Age: 79
End: 2024-03-27
Attending: NURSE PRACTITIONER
Payer: MEDICARE

## 2024-03-27 ENCOUNTER — ANESTHESIA (OUTPATIENT)
Dept: OPERATING ROOM | Facility: HOSPITAL | Age: 79
Setting detail: HOSPITAL OUTPATIENT SURGERY
End: 2024-03-27
Payer: MEDICARE

## 2024-03-27 ENCOUNTER — HOSPITAL ENCOUNTER (OUTPATIENT)
Facility: HOSPITAL | Age: 79
Discharge: HOME | End: 2024-03-28
Attending: ORTHOPAEDIC SURGERY | Admitting: ORTHOPAEDIC SURGERY
Payer: MEDICARE

## 2024-03-27 PROBLEM — Z98.890 S/P SHOULDER SURGERY: Status: ACTIVE | Noted: 2024-03-18

## 2024-03-27 LAB
ABO + RH BLD: NORMAL
D AG BLD QL: POSITIVE
GLUCOSE BLD-MCNC: 93 MG/DL (ref 70–99)
LABORATORY COMMENT REPORT: NORMAL
POCT TEST: NORMAL

## 2024-03-27 PROCEDURE — 27200000 HC STERILE SUPPLY: Performed by: ORTHOPAEDIC SURGERY

## 2024-03-27 PROCEDURE — 63600000 HC DRUGS/DETAIL CODE: Mod: JZ | Performed by: ORTHOPAEDIC SURGERY

## 2024-03-27 PROCEDURE — 25000000 HC PHARMACY GENERAL: Performed by: ORTHOPAEDIC SURGERY

## 2024-03-27 PROCEDURE — 37000001 HC ANESTHESIA GENERAL: Performed by: ORTHOPAEDIC SURGERY

## 2024-03-27 PROCEDURE — 73020 X-RAY EXAM OF SHOULDER: CPT | Mod: RT

## 2024-03-27 PROCEDURE — 36415 COLL VENOUS BLD VENIPUNCTURE: CPT | Performed by: ORTHOPAEDIC SURGERY

## 2024-03-27 PROCEDURE — C1713 ANCHOR/SCREW BN/BN,TIS/BN: HCPCS | Performed by: ORTHOPAEDIC SURGERY

## 2024-03-27 PROCEDURE — 63600000 HC DRUGS/DETAIL CODE: Mod: JZ | Performed by: NURSE PRACTITIONER

## 2024-03-27 PROCEDURE — 63600000 HC DRUGS/DETAIL CODE: Performed by: SPECIALIST

## 2024-03-27 PROCEDURE — 71000001 HC PACU PHASE 1 INITIAL 30MIN: Performed by: ORTHOPAEDIC SURGERY

## 2024-03-27 PROCEDURE — 0RRJ00Z REPLACEMENT OF RIGHT SHOULDER JOINT WITH REVERSE BALL AND SOCKET SYNTHETIC SUBSTITUTE, OPEN APPROACH: ICD-10-PCS | Performed by: ORTHOPAEDIC SURGERY

## 2024-03-27 PROCEDURE — 97535 SELF CARE MNGMENT TRAINING: CPT | Mod: GO

## 2024-03-27 PROCEDURE — 36000005 HC OR LEVEL 5 INITIAL 30MIN: Performed by: ORTHOPAEDIC SURGERY

## 2024-03-27 PROCEDURE — 97166 OT EVAL MOD COMPLEX 45 MIN: CPT | Mod: GO

## 2024-03-27 PROCEDURE — 25800000 HC PHARMACY IV SOLUTIONS: Performed by: NURSE PRACTITIONER

## 2024-03-27 PROCEDURE — 25800000 HC PHARMACY IV SOLUTIONS: Performed by: SPECIALIST

## 2024-03-27 PROCEDURE — 71000011 HC PACU PHASE 1 EA ADDL MIN: Performed by: ORTHOPAEDIC SURGERY

## 2024-03-27 PROCEDURE — 63700000 HC SELF-ADMINISTRABLE DRUG: Performed by: NURSE PRACTITIONER

## 2024-03-27 PROCEDURE — 99232 SBSQ HOSP IP/OBS MODERATE 35: CPT | Performed by: HOSPITALIST

## 2024-03-27 PROCEDURE — 97162 PT EVAL MOD COMPLEX 30 MIN: CPT | Mod: GP,KX

## 2024-03-27 PROCEDURE — C1776 JOINT DEVICE (IMPLANTABLE): HCPCS | Performed by: ORTHOPAEDIC SURGERY

## 2024-03-27 PROCEDURE — 25000000 HC PHARMACY GENERAL: Mod: JW | Performed by: SPECIALIST

## 2024-03-27 PROCEDURE — 36000015 HC OR LEVEL 5 EA ADDL MIN: Performed by: ORTHOPAEDIC SURGERY

## 2024-03-27 PROCEDURE — 97530 THERAPEUTIC ACTIVITIES: CPT | Mod: GP,KX

## 2024-03-27 DEVICE — RSP GLENOID HEAD W/ RETAINING SCREW 4MM SZ 32MM: Type: IMPLANTABLE DEVICE | Site: SHOULDER | Status: FUNCTIONAL

## 2024-03-27 DEVICE — GLENOID BASEPLATE 30MM RSP: Type: IMPLANTABLE DEVICE | Site: SHOULDER | Status: FUNCTIONAL

## 2024-03-27 DEVICE — SCREW 5.0 X 14MM RSP LOCKING: Type: IMPLANTABLE DEVICE | Site: SHOULDER | Status: FUNCTIONAL

## 2024-03-27 DEVICE — HUMERAL STEM SMALL SHELL 10 X 48MM POROUS COATED: Type: IMPLANTABLE DEVICE | Site: SHOULDER | Status: FUNCTIONAL

## 2024-03-27 DEVICE — INSERT SOCKET SM 32MM NEUTRAL: Type: IMPLANTABLE DEVICE | Site: SHOULDER | Status: FUNCTIONAL

## 2024-03-27 DEVICE — SCREW 5.0 X 30MM RSP LOCKING: Type: IMPLANTABLE DEVICE | Site: SHOULDER | Status: FUNCTIONAL

## 2024-03-27 RX ORDER — DEXTROSE 40 %
15-30 GEL (GRAM) ORAL AS NEEDED
Status: DISCONTINUED | OUTPATIENT
Start: 2024-03-27 | End: 2024-03-28 | Stop reason: HOSPADM

## 2024-03-27 RX ORDER — PROPOFOL 10 MG/ML
INJECTION, EMULSION INTRAVENOUS AS NEEDED
Status: DISCONTINUED | OUTPATIENT
Start: 2024-03-27 | End: 2024-03-27 | Stop reason: SURG

## 2024-03-27 RX ORDER — SODIUM CHLORIDE 9 MG/ML
INJECTION, SOLUTION INTRAVENOUS CONTINUOUS PRN
Status: DISCONTINUED | OUTPATIENT
Start: 2024-03-27 | End: 2024-03-27 | Stop reason: SURG

## 2024-03-27 RX ORDER — EPHEDRINE SULFATE 50 MG/ML
INJECTION, SOLUTION INTRAVENOUS AS NEEDED
Status: DISCONTINUED | OUTPATIENT
Start: 2024-03-27 | End: 2024-03-27 | Stop reason: SURG

## 2024-03-27 RX ORDER — ROCURONIUM BROMIDE 10 MG/ML
INJECTION, SOLUTION INTRAVENOUS AS NEEDED
Status: DISCONTINUED | OUTPATIENT
Start: 2024-03-27 | End: 2024-03-27 | Stop reason: SURG

## 2024-03-27 RX ORDER — ESCITALOPRAM OXALATE 10 MG/1
10 TABLET ORAL DAILY
Status: DISCONTINUED | OUTPATIENT
Start: 2024-03-28 | End: 2024-03-28 | Stop reason: HOSPADM

## 2024-03-27 RX ORDER — IBUPROFEN 200 MG
16-32 TABLET ORAL AS NEEDED
Status: DISCONTINUED | OUTPATIENT
Start: 2024-03-27 | End: 2024-03-27 | Stop reason: HOSPADM

## 2024-03-27 RX ORDER — FENTANYL CITRATE 50 UG/ML
50 INJECTION, SOLUTION INTRAMUSCULAR; INTRAVENOUS EVERY 5 MIN PRN
Status: DISCONTINUED | OUTPATIENT
Start: 2024-03-27 | End: 2024-03-27 | Stop reason: HOSPADM

## 2024-03-27 RX ORDER — DEXTROSE 40 %
15-30 GEL (GRAM) ORAL AS NEEDED
Status: DISCONTINUED | OUTPATIENT
Start: 2024-03-27 | End: 2024-03-27 | Stop reason: HOSPADM

## 2024-03-27 RX ORDER — METOCLOPRAMIDE HYDROCHLORIDE 5 MG/ML
10 INJECTION INTRAMUSCULAR; INTRAVENOUS
Status: DISCONTINUED | OUTPATIENT
Start: 2024-03-27 | End: 2024-03-27 | Stop reason: HOSPADM

## 2024-03-27 RX ORDER — LEVOTHYROXINE SODIUM 175 UG/1
175 TABLET ORAL
Status: DISCONTINUED | OUTPATIENT
Start: 2024-03-28 | End: 2024-03-28 | Stop reason: HOSPADM

## 2024-03-27 RX ORDER — DEXTROSE 50 % IN WATER (D50W) INTRAVENOUS SYRINGE
25 AS NEEDED
Status: DISCONTINUED | OUTPATIENT
Start: 2024-03-27 | End: 2024-03-28 | Stop reason: HOSPADM

## 2024-03-27 RX ORDER — HYDROMORPHONE HYDROCHLORIDE 1 MG/ML
0.5 INJECTION, SOLUTION INTRAMUSCULAR; INTRAVENOUS; SUBCUTANEOUS
Status: DISCONTINUED | OUTPATIENT
Start: 2024-03-27 | End: 2024-03-27 | Stop reason: HOSPADM

## 2024-03-27 RX ORDER — SODIUM CHLORIDE 9 MG/ML
INJECTION, SOLUTION INTRAVENOUS CONTINUOUS
Status: DISCONTINUED | OUTPATIENT
Start: 2024-03-27 | End: 2024-03-28 | Stop reason: HOSPADM

## 2024-03-27 RX ORDER — AMOXICILLIN 250 MG
1 CAPSULE ORAL 2 TIMES DAILY
Status: DISCONTINUED | OUTPATIENT
Start: 2024-03-27 | End: 2024-03-28 | Stop reason: HOSPADM

## 2024-03-27 RX ORDER — ONDANSETRON HYDROCHLORIDE 2 MG/ML
4 INJECTION, SOLUTION INTRAVENOUS
Status: DISCONTINUED | OUTPATIENT
Start: 2024-03-27 | End: 2024-03-27 | Stop reason: HOSPADM

## 2024-03-27 RX ORDER — LIDOCAINE HYDROCHLORIDE 10 MG/ML
INJECTION, SOLUTION INFILTRATION; PERINEURAL AS NEEDED
Status: DISCONTINUED | OUTPATIENT
Start: 2024-03-27 | End: 2024-03-27 | Stop reason: SURG

## 2024-03-27 RX ORDER — ACETAMINOPHEN 325 MG/1
650 TABLET ORAL EVERY 4 HOURS PRN
Status: DISCONTINUED | OUTPATIENT
Start: 2024-03-27 | End: 2024-03-28 | Stop reason: HOSPADM

## 2024-03-27 RX ORDER — BISACODYL 10 MG/1
10 SUPPOSITORY RECTAL DAILY PRN
Status: DISCONTINUED | OUTPATIENT
Start: 2024-03-27 | End: 2024-03-28 | Stop reason: HOSPADM

## 2024-03-27 RX ORDER — ROPIVACAINE HYDROCHLORIDE 5 MG/ML
INJECTION, SOLUTION EPIDURAL; INFILTRATION; PERINEURAL
Status: COMPLETED | OUTPATIENT
Start: 2024-03-27 | End: 2024-03-27

## 2024-03-27 RX ORDER — MIDAZOLAM HYDROCHLORIDE 2 MG/2ML
1 INJECTION, SOLUTION INTRAMUSCULAR; INTRAVENOUS AS NEEDED
Status: DISCONTINUED | OUTPATIENT
Start: 2024-03-27 | End: 2024-03-27 | Stop reason: HOSPADM

## 2024-03-27 RX ORDER — DEXTROSE 50 % IN WATER (D50W) INTRAVENOUS SYRINGE
25 AS NEEDED
Status: DISCONTINUED | OUTPATIENT
Start: 2024-03-27 | End: 2024-03-27 | Stop reason: HOSPADM

## 2024-03-27 RX ORDER — MEPERIDINE HYDROCHLORIDE 25 MG/ML
12.5 INJECTION INTRAMUSCULAR; INTRAVENOUS; SUBCUTANEOUS EVERY 10 MIN PRN
Status: DISCONTINUED | OUTPATIENT
Start: 2024-03-27 | End: 2024-03-27 | Stop reason: HOSPADM

## 2024-03-27 RX ORDER — NEOSTIGMINE METHYLSULFATE 1 MG/ML
INJECTION INTRAVENOUS AS NEEDED
Status: DISCONTINUED | OUTPATIENT
Start: 2024-03-27 | End: 2024-03-27 | Stop reason: SURG

## 2024-03-27 RX ORDER — OXYCODONE HYDROCHLORIDE 5 MG/1
5-10 TABLET ORAL EVERY 4 HOURS PRN
Status: DISCONTINUED | OUTPATIENT
Start: 2024-03-27 | End: 2024-03-28 | Stop reason: HOSPADM

## 2024-03-27 RX ORDER — MIDAZOLAM HYDROCHLORIDE 2 MG/2ML
INJECTION, SOLUTION INTRAMUSCULAR; INTRAVENOUS AS NEEDED
Status: DISCONTINUED | OUTPATIENT
Start: 2024-03-27 | End: 2024-03-27 | Stop reason: SURG

## 2024-03-27 RX ORDER — NAPROXEN SODIUM 220 MG/1
81 TABLET, FILM COATED ORAL DAILY
Status: DISCONTINUED | OUTPATIENT
Start: 2024-03-28 | End: 2024-03-28 | Stop reason: HOSPADM

## 2024-03-27 RX ORDER — POLYETHYLENE GLYCOL 3350 17 G/17G
17 POWDER, FOR SOLUTION ORAL DAILY
Status: DISCONTINUED | OUTPATIENT
Start: 2024-03-27 | End: 2024-03-28 | Stop reason: HOSPADM

## 2024-03-27 RX ORDER — FENTANYL CITRATE 50 UG/ML
INJECTION, SOLUTION INTRAMUSCULAR; INTRAVENOUS AS NEEDED
Status: DISCONTINUED | OUTPATIENT
Start: 2024-03-27 | End: 2024-03-27 | Stop reason: SURG

## 2024-03-27 RX ORDER — DEXAMETHASONE SODIUM PHOSPHATE 4 MG/ML
INJECTION, SOLUTION INTRA-ARTICULAR; INTRALESIONAL; INTRAMUSCULAR; INTRAVENOUS; SOFT TISSUE AS NEEDED
Status: DISCONTINUED | OUTPATIENT
Start: 2024-03-27 | End: 2024-03-27 | Stop reason: SURG

## 2024-03-27 RX ORDER — SODIUM CHLORIDE, SODIUM GLUCONATE, SODIUM ACETATE, POTASSIUM CHLORIDE AND MAGNESIUM CHLORIDE 30; 37; 368; 526; 502 MG/100ML; MG/100ML; MG/100ML; MG/100ML; MG/100ML
INJECTION, SOLUTION INTRAVENOUS CONTINUOUS PRN
Status: DISCONTINUED | OUTPATIENT
Start: 2024-03-27 | End: 2024-03-27 | Stop reason: SURG

## 2024-03-27 RX ORDER — GLYCOPYRROLATE 0.6MG/3ML
SYRINGE (ML) INTRAVENOUS AS NEEDED
Status: DISCONTINUED | OUTPATIENT
Start: 2024-03-27 | End: 2024-03-27 | Stop reason: SURG

## 2024-03-27 RX ORDER — ONDANSETRON HYDROCHLORIDE 2 MG/ML
4 INJECTION, SOLUTION INTRAVENOUS EVERY 6 HOURS PRN
Status: DISCONTINUED | OUTPATIENT
Start: 2024-03-27 | End: 2024-03-28 | Stop reason: HOSPADM

## 2024-03-27 RX ORDER — ALUMINUM HYDROXIDE, MAGNESIUM HYDROXIDE, AND SIMETHICONE 1200; 120; 1200 MG/30ML; MG/30ML; MG/30ML
30 SUSPENSION ORAL EVERY 4 HOURS PRN
Status: DISCONTINUED | OUTPATIENT
Start: 2024-03-27 | End: 2024-03-28 | Stop reason: HOSPADM

## 2024-03-27 RX ORDER — DIPHENHYDRAMINE HCL 50 MG/ML
12.5 VIAL (ML) INJECTION
Status: DISCONTINUED | OUTPATIENT
Start: 2024-03-27 | End: 2024-03-27 | Stop reason: HOSPADM

## 2024-03-27 RX ORDER — PHENYLEPHRINE HYDROCHLORIDE 10 MG/ML
INJECTION INTRAVENOUS AS NEEDED
Status: DISCONTINUED | OUTPATIENT
Start: 2024-03-27 | End: 2024-03-27 | Stop reason: SURG

## 2024-03-27 RX ORDER — CETIRIZINE HYDROCHLORIDE 5 MG/1
5 TABLET ORAL NIGHTLY
Status: DISCONTINUED | OUTPATIENT
Start: 2024-03-27 | End: 2024-03-28 | Stop reason: HOSPADM

## 2024-03-27 RX ORDER — DIPHENHYDRAMINE HCL 25 MG
25 CAPSULE ORAL EVERY 6 HOURS PRN
Status: DISCONTINUED | OUTPATIENT
Start: 2024-03-27 | End: 2024-03-28 | Stop reason: HOSPADM

## 2024-03-27 RX ORDER — IBUPROFEN 200 MG
16-32 TABLET ORAL AS NEEDED
Status: DISCONTINUED | OUTPATIENT
Start: 2024-03-27 | End: 2024-03-28 | Stop reason: HOSPADM

## 2024-03-27 RX ORDER — ONDANSETRON HYDROCHLORIDE 2 MG/ML
INJECTION, SOLUTION INTRAVENOUS AS NEEDED
Status: DISCONTINUED | OUTPATIENT
Start: 2024-03-27 | End: 2024-03-27 | Stop reason: SURG

## 2024-03-27 RX ADMIN — PROPOFOL 50 MG: 10 INJECTION, EMULSION INTRAVENOUS at 09:15

## 2024-03-27 RX ADMIN — EPHEDRINE SULFATE 10 MG: 50 INJECTION, SOLUTION INTRAVENOUS at 10:10

## 2024-03-27 RX ADMIN — ONDANSETRON 4 MG: 2 INJECTION INTRAMUSCULAR; INTRAVENOUS at 10:00

## 2024-03-27 RX ADMIN — PHENYLEPHRINE HYDROCHLORIDE 100 MCG: 10 INJECTION INTRAVENOUS at 09:25

## 2024-03-27 RX ADMIN — SODIUM CHLORIDE: 9 INJECTION, SOLUTION INTRAVENOUS at 13:08

## 2024-03-27 RX ADMIN — ROCURONIUM BROMIDE 20 MG: 10 INJECTION, SOLUTION INTRAVENOUS at 09:59

## 2024-03-27 RX ADMIN — SODIUM CHLORIDE, SODIUM GLUCONATE, SODIUM ACETATE, POTASSIUM CHLORIDE AND MAGNESIUM CHLORIDE: 526; 502; 368; 37; 30 INJECTION, SOLUTION INTRAVENOUS at 09:26

## 2024-03-27 RX ADMIN — EPHEDRINE SULFATE 10 MG: 50 INJECTION, SOLUTION INTRAVENOUS at 09:09

## 2024-03-27 RX ADMIN — EPHEDRINE SULFATE 10 MG: 50 INJECTION, SOLUTION INTRAVENOUS at 09:28

## 2024-03-27 RX ADMIN — PHENYLEPHRINE HYDROCHLORIDE 100 MCG: 10 INJECTION INTRAVENOUS at 09:43

## 2024-03-27 RX ADMIN — ACETAMINOPHEN 650 MG: 325 TABLET ORAL at 19:17

## 2024-03-27 RX ADMIN — FENTANYL CITRATE 50 MCG: 50 INJECTION INTRAMUSCULAR; INTRAVENOUS at 08:19

## 2024-03-27 RX ADMIN — CEFAZOLIN 2 G: 2 INJECTION, POWDER, FOR SOLUTION INTRAMUSCULAR; INTRAVENOUS at 16:45

## 2024-03-27 RX ADMIN — ACETAMINOPHEN 650 MG: 325 TABLET ORAL at 13:08

## 2024-03-27 RX ADMIN — CEFAZOLIN 2 G: 2 INJECTION, POWDER, FOR SOLUTION INTRAMUSCULAR; INTRAVENOUS at 09:03

## 2024-03-27 RX ADMIN — ROCURONIUM BROMIDE 50 MG: 10 INJECTION, SOLUTION INTRAVENOUS at 08:38

## 2024-03-27 RX ADMIN — LIDOCAINE HYDROCHLORIDE 5 ML: 10 INJECTION, SOLUTION INFILTRATION; PERINEURAL at 08:38

## 2024-03-27 RX ADMIN — PHENYLEPHRINE HYDROCHLORIDE 100 MCG: 10 INJECTION INTRAVENOUS at 10:10

## 2024-03-27 RX ADMIN — MIDAZOLAM HYDROCHLORIDE 2 MG: 1 INJECTION, SOLUTION INTRAMUSCULAR; INTRAVENOUS at 08:19

## 2024-03-27 RX ADMIN — ROPIVACAINE HYDROCHLORIDE 20 ML: 5 INJECTION, SOLUTION EPIDURAL; INFILTRATION; PERINEURAL at 08:37

## 2024-03-27 RX ADMIN — NEOSTIGMINE METHYLSULFATE 3 MG: 1 INJECTION INTRAVENOUS at 10:44

## 2024-03-27 RX ADMIN — PROPOFOL 140 MG: 10 INJECTION, EMULSION INTRAVENOUS at 08:38

## 2024-03-27 RX ADMIN — SODIUM CHLORIDE: 0.9 INJECTION, SOLUTION INTRAVENOUS at 08:17

## 2024-03-27 RX ADMIN — DEXAMETHASONE SODIUM PHOSPHATE 4 MG: 4 INJECTION, SOLUTION INTRA-ARTICULAR; INTRALESIONAL; INTRAMUSCULAR; INTRAVENOUS; SOFT TISSUE at 10:00

## 2024-03-27 RX ADMIN — FENTANYL CITRATE 50 MCG: 50 INJECTION INTRAMUSCULAR; INTRAVENOUS at 10:57

## 2024-03-27 RX ADMIN — SENNOSIDES, DOCUSATE SODIUM 1 TABLET: 8.6; 5 TABLET ORAL at 20:19

## 2024-03-27 RX ADMIN — GLYCOPYRROLATE 0.6 MG: 0.2 INJECTION, SOLUTION INTRAMUSCULAR; INTRAVENOUS at 10:44

## 2024-03-27 RX ADMIN — ROCURONIUM BROMIDE 30 MG: 10 INJECTION, SOLUTION INTRAVENOUS at 09:11

## 2024-03-27 ASSESSMENT — COGNITIVE AND FUNCTIONAL STATUS - GENERAL
MOVING TO AND FROM BED TO CHAIR: 4 - NONE
CLIMB 3 TO 5 STEPS WITH RAILING: 3 - A LITTLE
AFFECT: WFL
STANDING UP FROM CHAIR USING ARMS: 4 - NONE
WALKING IN HOSPITAL ROOM: 4 - NONE
DRESSING REGULAR UPPER BODY CLOTHING: 3 - A LITTLE
CLIMB 3 TO 5 STEPS WITH RAILING: 3 - A LITTLE
EATING MEALS: 3 - A LITTLE
CLIMB 3 TO 5 STEPS WITH RAILING: 3 - A LITTLE
STANDING UP FROM CHAIR USING ARMS: 3 - A LITTLE
AFFECT: WFL
STANDING UP FROM CHAIR USING ARMS: 4 - NONE
WALKING IN HOSPITAL ROOM: 4 - NONE
HELP NEEDED FOR PERSONAL GROOMING: 3 - A LITTLE
DRESSING REGULAR LOWER BODY CLOTHING: 3 - A LITTLE
WALKING IN HOSPITAL ROOM: 3 - A LITTLE
EATING MEALS: 4 - NONE
DRESSING REGULAR LOWER BODY CLOTHING: 3 - A LITTLE
DRESSING REGULAR UPPER BODY CLOTHING: 3 - A LITTLE
CLIMB 3 TO 5 STEPS WITH RAILING: 3 - A LITTLE
TOILETING: 3 - A LITTLE
WALKING IN HOSPITAL ROOM: 3 - A LITTLE
HELP NEEDED FOR BATHING: 3 - A LITTLE
MOVING TO AND FROM BED TO CHAIR: 3 - A LITTLE
TOILETING: 3 - A LITTLE
MOVING TO AND FROM BED TO CHAIR: 3 - A LITTLE
STANDING UP FROM CHAIR USING ARMS: 3 - A LITTLE
MOVING TO AND FROM BED TO CHAIR: 4 - NONE
HELP NEEDED FOR PERSONAL GROOMING: 3 - A LITTLE
HELP NEEDED FOR BATHING: 3 - A LITTLE

## 2024-03-27 ASSESSMENT — PAIN SCALES - GENERAL: PAIN_LEVEL: 1

## 2024-03-27 NOTE — ANESTHESIA PROCEDURE NOTES
Peripheral Block    Start time: 3/27/2024 8:23 AM  End time: 3/27/2024 8:35 AM  Reason for block: at surgeon's request and post-op pain management  Staffing  Performed: anesthesiologist   Anesthesiologist: Ryan Warner MD  Performed by: Ryan Warner MD  Authorized by: Ryan Warner MD    Preanesthetic Checklist  Completed: patient identified, surgical consent, pre-op evaluation, timeout performed, IV checked, risks and benefits discussed, monitors and equipment checked and sterile field maintained during procedure  Peripheral Block  Prep: ChloraPrep and site prepped and draped  Patient monitoring: heart rate, cardiac monitor, continuous pulse ox and blood pressure  Block type: interscalene  Laterality: right      Guidance: nerve stimulator and ultrasound guided    Image visualization comments : Ultrasound used to visualize needle placement in appropriate tissue plane.: Ultrasound used to visualize medication disbursement around appropriate nerve structures.      Needle  Needle gauge: 22 G  Needle length: 3 1/8 in  Needle localization: nerve stimulator and ultrasound guidance  Test dose: negative  Assessment  Injection assessment: negative aspiration for heme, incremental injection, no paresthesia on injection, local visualized surrounding nerve on ultrasound and transient paresthesias  Paresthesia pain: immediately resolved  Heart rate change: no  Additional Notes  Pt tolerated procedure well    Medications Administered -   ropivacaine PF (NAROPIN) injection 0.5 % - perineural injection   20 mL - 3/27/2024 8:37:00 AM

## 2024-03-27 NOTE — PROGRESS NOTES
Physical Therapy -  Initial Evaluation     Patient: Iza Crenshaw  Location: [facility] 5PAV 5436  MRN: [id number]  Today's date: 3/27/2024    HISTORY OF PRESENT ILLNESS     Iza is a 78 y.o. 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 Principal problem is No Principal Problem: There is no principal problem currently on the Problem List. Please update the Problem List and refresh..    Past Medical History  Iza has a past medical history of Anxiety (1995), Depression, Hypothyroidism (1995), and Torn rotator cuff.    History of Present Illness   Admitted 3/27 for right Reverse total shoulder replacement with biceps tenodesis     140/40 ROM  NWB RUE  PRIOR LEVEL OF FUNCTION AND LIVING ENVIRONMENT     Prior Level of Function      Flowsheet Row Most Recent Value   Dominant Hand right   Ambulation independent   Transferring independent   Toileting independent   Bathing independent   Dressing independent   Eating independent   IADLs independent   Driving/Transportation    Communication understands/communicates without difficulty   Prior Level of Function Comment Pt reports I with ADLs and ambulation PTA   Assistive Device Currently Used at Home --  [owns SPC and walking sticks and shower chair]          Prior Living Environment      Flowsheet Row Most Recent Value   People in Home spouse   Current Living Arrangements home   Living Environment Comment 1SH with spouse, 1STE, tub shower+SC          VITALS AND PAIN     PT Vitals      Date/Time Pulse SpO2 Pt Activity O2 Therapy BP BP Location BP Method Pt Position Who   03/27/24 1341 94 93 % At rest None (Room air) 118/60 Left upper arm Automatic Lying EEW   03/27/24 1350 97 94 % At rest None (Room air) 125/70 Left upper arm Automatic Sitting EEW          PT Pain       Date/Time Pain Type Location Rating: Rest Rating: Rest Rating: Activity [facility]   03/27/24 1341 Pain Assessment -- -- 0 - no pain 0 - no pain EEW   03/27/24 1353 Pain Reassessment shoulder 5 -- -- LG             Objective   OBJECTIVE     Start time:  1340  End time:  1407  Session Length: 27 min  Mode of Treatment: co-treatment, physical therapy (post op day 0)    General Observations  Patient received upright, in bed. She was agreeable to therapy, no issues or concerns identified by nurse prior to session. + RUE sling [place]beck, spouse present t/o session    Precautions: weight bearing, shoulder, range of motion  Extremity Weight-Bearing Status: right upper extremity  Right UE Weight-Bearing Status: non weight-bearing (NWB) (Passive forward elevation to 140 degrees, external rotation to 40 degrees)           PT Eval and Treat - 03/27/24 1340          Cognition    Orientation Status oriented x 4     Affect/Mental Status WFL     Follows Commands WFL     Comment, Cognition pleasant/cooperative/receptive/motivated        Sensory Assessment    Sensory Assessment sensation intact, lower extremities   grossly to light touch       Lower Extremity Assessment    General Observations Assessed functionally BLE ROM WFL and strength at least 3/5        Bed Mobility    Bed Mobility Activities left;supine to sit     [place] modified independence     Assistive Device head of bed elevated        Mobility Belt    Mobility Belt Used for All Out of Bed Activity no     Reason Mobility Belt Not Used other (see comments)     Reason Mobility Belt Not Used mod I        Sit/Stand Transfer    Surface edge of bed;chair with arm rests   + toilet    [place] modified independence     Assistive Device none        Gait Training    [place], Gait modified independence     Assistive Device none     Distance in Feet 150 feet     Pattern step-through     Comment (Gait/Stairs) 20'x1, 10'x1 and 150'x1 supervision->mod I. Mild lateral sway and  L shoulder elevation observed but no overt LOBs observed. PT educated regarding rec to use SPC vs. walking stick for safe amb initially at d/c. Pt receptive to education        Stairs Training    [place], Stairs close supervision     Number of Stairs 1     Ascending Stairs Technique step-to-step     Descending Stairs Technique step-to-step     Comment Performed step up on portable step stool in pt's room using bedrail for LUE support as pt reports she can hold onto doorway at home. PT educated to ensure somoene is w/ her on step to enter home.        Balance    Static Sitting Balance WFL     Sit to Stand Dynamic Balance WFL     Static Standing Balance WFL;unsupported     Dynamic Standing Balance mild impairment;unsupported     Comment, Balance mild lateral sway observed but no overt LOBs observed t/o session. Pt may benefit from use of SPC vs. walking stick for inc balance/safety at d/c.                            Orthosis Shoulder Right Shoulder Immobilizer (Active)   Date Obtained/Time Obtained: 03/27/24 0900   Location: Shoulder  Laterality: Right  Type: Shoulder Immobilizer  Therapeutic Indications: positioning/protection;stabilization and support  Wearing Schedule: wear at all times (remove only for hygiene and...     Orthosis Shoulder Right Shoulder Immobilizer (Active)   Skin Assessment intact 03/27/24 1418   Compliance/Wearing Issues patient;compliant with wearing schedule 03/27/24 1418       Education Documentation  Rehabilitation Therapy, taught by Lashae Gregorio PT at 3/27/2024  2:46 PM.  Learner: Patient  Readiness: Acceptance  Method: Explanation  Response: Verbalizes Understanding  Comment: Role of PT, PT POC, NWB RUE, pacing mobility/use SPC vs. walking stick at d/c, safety during mobility, have someone w/ her on step to enter home, amb w/ RN staff as able, ensure staff present prior to mobility        Session Outcome  Patient upright, in chair (+ RUE sling donned) at end of session, chair alarm  on, call light in reach, personal items in reach, all needs met. Nursing notified about patient's performance and patient's position.    AM-PAC™ - Mobility (Current Function)     Turning form your back to your side while in flat bed without using bedrails 4 - None   Moving from lying on your back to sitting on the side of a flat bed without using bedrails 4 - None   Moving to and from a bed to a chair 4 - None   Standing up from a chair using your arms 4 - None   To walk in a hospital room 4 - None   Climbing 3-5 steps with a railing 3 - A Little   AM-PAC™ Mobility Score 23      ASSESSMENT AND PLAN     Progress Summary  APMAC 23/24. Pt at mod I level for bed mobility, STS & amb w/ RW and close supervision for step up. No overt LOBs observed t/o session and pt receptive to all PT education. Pt reports no concerns regarding mobility at d/c. She has no further acute PT needs at this time. Rec home w/ assist + outpatient PT as appropriate for higher level balance/endurance training.    Patient/Family Therapy Goals Statement: home    PT Plan      Flowsheet Row Most Recent Value   Therapy Frequency evaluation only at 03/27/2024 1340            PT Discharge Recommendations      Flowsheet Row Most Recent Value   PT Recommended Discharge Disposition home with assistance, home with outpatient services at 03/27/2024 1340   Anticipated Equipment Needs if Discharged Home (PT) none  [pt/spouse report they own SPC, walking sticks and shower chair] at 03/27/2024 1340

## 2024-03-27 NOTE — PLAN OF CARE
Problem: Adult Inpatient Plan of Care  Goal: Plan of Care Review  Outcome: Progressing  Flowsheets (Taken 3/27/2024 9228)  Outcome Evaluation: OT ramon complete.  Plan of Care Reviewed With: patient     Problem: Shoulder Arthroplasty (Total, Guido, Reverse)  Goal: Optimal Functional Ability  Outcome: Progressing

## 2024-03-27 NOTE — ANESTHESIA POSTPROCEDURE EVALUATION
Patient: Iza Crenshaw    Procedure Summary       Date: 03/27/24 Room / Location: [facility] PAV OR 06 / [facility] OR PAV    Anesthesia Start: 0817 Anesthesia Stop: 1109    Procedure: Right Reverse Total Shoulder Arthroplasty (Right) Diagnosis:       Complete tear of right rotator cuff, unspecified whether traumatic      Primary osteoarthritis of right shoulder      (Complete tear of right rotator cuff, unspecified whether traumatic [M75.121])      (Primary osteoarthritis of right shoulder [M19.011])    Surgeons: Noel Neri MD Responsible Provider: Ryan Warner MD    Anesthesia Type: general ASA Status: 3            Anesthesia Type: general  PACU Vitals    No data found in the last 10 encounters.           Anesthesia Post Evaluation    Pain score: 1  Pain management: adequate  Patient location during evaluation: PACU  Patient participation: complete - patient participated  Level of consciousness: awake and alert  Cardiovascular status: acceptable  Airway Patency: adequate  Respiratory status: acceptable  Hydration status: acceptable  Anesthetic complications: no

## 2024-03-27 NOTE — OR SURGEON
Pre-Procedure patient identification:  I am the primary operating surgeon/proceduralist and I have reviewed the applicable pathology reports and radiology studies for this procedure. I have identified the patient and confirmed laterality is right on 03/27/24 at 6:45 AM Noel Neri MD  Phone Number: [phone]

## 2024-03-27 NOTE — ASSESSMENT & PLAN NOTE
-s/p Right Reverse Total Shoulder Arthroplasty by Dr Neri on 3/27/24   - Management as per Ortho  - Pain control per Ortho  - DVT ppx per Ortho  - PT/OT  - Encourage IS  - Bowel regimen

## 2024-03-27 NOTE — ANESTHESIA PROCEDURE NOTES
Airway  Urgency: elective    Start Time: 3/27/2024 8:41 AM    General Information and Staff    Patient location during procedure: OR  Anesthesiologist: Ryan Warner MD  Performed by: Ryan Warner MD  Authorized by: Ryan Warner MD      Indications and Patient Condition  Indications for airway management: anesthesia  Sedation level: deep  Preoxygenated: yes  Patient position: sniffing      Final Airway Details  Final airway type: endotracheal airway      Successful airway: ETT     Successful intubation technique: direct laryngoscopy  Facilitating devices/methods: intubating stylet  Endotracheal tube insertion site: oral  Blade: Sarah  Blade size: #3  ETT size (mm): 7.0  Placement verified by: chest auscultation, capnometry and single lung ventilation   Measured from: lips  ETT to lips (cm): 21  Number of attempts at approach: 1  Atraumatic airway insertion

## 2024-03-27 NOTE — HOSPITAL COURSE
Iza is a 78 y.o. 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 Principal problem is No Principal Problem: There is no principal problem currently on the Problem List. Please update the Problem List and refresh..    Past Medical History  Iza has a past medical history of Anxiety (1995), Depression, Hypothyroidism (1995), and Torn rotator cuff.    History of Present Illness   Admitted 3/27 for right Reverse total shoulder replacement with biceps tenodesis     140/40 ROM  NWB RUE

## 2024-03-27 NOTE — PROGRESS NOTES
Orthopedic Surgery Postoperative Note    Subjective:   POD #0 s/p  Right Reverse total shoulder replacement with biceps tenodesis     Doing well postoperatively. Pain is controlled      Objective:  Vitals:    03/27/24 1317   BP: (!) 98/57   Pulse: 94   Resp: 16   Temp: 36.4 °C (97.5 °F)   SpO2: 95%       Physical Exam:  Afebrile  Follows commands  Symmetric chest rise bilaterally with normal respiratory effort    Musculoskeletal:  Right Upper Extremity:  Dressings clean, dry, intact  Fires wrist extensors, finger flexors, hand intrinsics  Sensation intact to light touch in axillary, median, radial, ulnar nerve distributions  Palpable radial pulse      Assessment & Plan:  78 y.o. female s/p Right Reverse total shoulder replacement with biceps tenodesis     - POD: Day of Surgery  - ROM: passive forward elevation 140, passive external rotation 40  - Pain control  - Weight bearing status: POLLO GARCIA in ultrasling  - DVT ppx: ASA   - PT/OT

## 2024-03-27 NOTE — PROGRESS NOTES
Occupational Therapy -  Initial Evaluation     Patient: Iza Crenshaw  Location: [facility] 5PAV 5436  MRN: [id number]  Today's date: 3/27/2024    HISTORY OF PRESENT ILLNESS     Iza is a 78 y.o. 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 Principal problem is No Principal Problem: There is no principal problem currently on the Problem List. Please update the Problem List and refresh..    Past Medical History  Iza has a past medical history of Anxiety (1995), Depression, Hypothyroidism (1995), and Torn rotator cuff.    History of Present Illness   Admitted 3/27 for right Reverse total shoulder replacement with biceps tenodesis     140/40 ROM  NWB RUE  PRIOR LEVEL OF FUNCTION AND LIVING ENVIRONMENT     Prior Level of Function      Flowsheet Row Most Recent Value   Dominant Hand right   Ambulation independent   Transferring independent   Toileting independent   Bathing independent   Dressing independent   Eating independent   IADLs independent   Driving/Transportation    Communication understands/communicates without difficulty   Prior Level of Function Comment Pt reports I with ADLs and ambulation PTA   Assistive Device Currently Used at Home --  [owns SPC and walking sticks and shower chair]          Prior Living Environment      Flowsheet Row Most Recent Value   People in Home spouse   Current Living Arrangements home   Living Environment Comment 1SH with spouse, 1STE, tub shower+SC          Occupational Profile      Flowsheet Row Most Recent Value   Successful Occupations retired teacher and principal   Environmental Supports and Barriers supportive spouse          VITALS AND PAIN     OT Vitals      Date/Time Pulse SpO2 Pt Activity O2 Therapy BP BP Location BP Method Pt Position [employer]   03/27/24 1341 94  93 % At rest None (Room air) 118/60 Left upper arm Automatic Lying EEW   03/27/24 1350 97 94 % At rest None (Room air) 125/70 Left upper arm Automatic Sitting EEW          OT Pain      Date/Time Pain Type Location Rating: Rest Rating: Rest Rating: Activity Who   03/27/24 1341 Pain Assessment -- -- 0 - no pain 0 - no pain EEW   03/27/24 1353 Pain Reassessment shoulder 5 -- -- LG             Objective   OBJECTIVE     Start time:  1339  End time:  1408  Session Length: 29 min  Mode of Treatment: co-treatment, occupational therapy (post op day 0)    General Observations  Patient received upright, in bed. She was agreeable to therapy, no issues or concerns identified by nurse prior to session. spouse, Ed, at bedside    Precautions: weight bearing, shoulder, range of motion  Extremity Weight-Bearing Status: right upper extremity  Right UE Weight-Bearing Status: non weight-bearing (NWB)           OT Eval and Treat - 03/27/24 1339          Cognition    Orientation Status oriented x 4     Affect/Mental Status WFL     Follows Commands WFL     Cognitive Function WFL        Vision Assessment/Intervention    Vision Assessment corrective lenses full-time        Hearing Assessment    Hearing Status WFL        Sensory Assessment    Sensory Assessment sensation intact except   RUE numb from nerve block       Upper Extremity Assessment    UE Assessment ROM and Strength WFL     General Observations RUE NT 2/2 s/p reverse TSA        Bed Mobility    Bed Mobility Activities supine to sit     [place] modified independence     Assistive Device head of bed elevated     Comment OOB to L        Mobility Belt    Mobility Belt Used for All Out of Bed Activity no     Reason Mobility Belt Not Used other (see comments)     Reason Mobility Belt Not Used mod I        Sit/Stand Transfer    Surface edge of bed     [place] modified independence     Assistive Device none        Toilet Transfer    Transfer Technique sit/stand     [place]  modified independence     Assistive Device grab bars/safety frame     Comment to/from standard height toilet        Functional Mobility    Distance in room/bathroom     Functional Mobility [place] supervision     Safety/Cues verbal cues     Assistive Device none     Functional Mobility Comments short household distances        Upper Body Dressing    Comment reviewed UBD technique post op - dress operated UE first, undress second        Grooming    Tasks washes, rinses and dries hands     [place] supervision     Position sink side     Adaptive Equipment none        Toileting    Tasks perform bladder hygiene     [place] supervision     Position unsupported sitting;unsupported standing     Adaptive Equipment none        Balance    Static Sitting Balance WFL     Dynamic Sitting Balance WFL     Sit to Stand Dynamic Balance WFL     Static Standing Balance WFL     Dynamic Standing Balance mild impairment     Comment, Balance no overt LOB        Upper Extremity (Therapeutic Exercise)    Exercise Position/Type supine     General Exercise right;wand exercises, left assist to right     Range of Motion Exercises shoulder flexion/extension;shoulder external/internal rotation     Reps and Sets 10x4     Comment Pt educated on 140/40 exercises per Dr. Neri's exercise protocol. Demontration and handout provided. Pt verbalized understanding and importance of adherence to promote ROM post op.                            Orthosis Shoulder Right Shoulder Immobilizer (Active)   Date Obtained/Time Obtained: 03/27/24 0900   Location: Shoulder  Laterality: Right  Type: Shoulder Immobilizer  Therapeutic Indications: positioning/protection;stabilization and support  Wearing Schedule: wear at all times (remove only for hygiene and...     Orthosis Shoulder Right Shoulder Immobilizer (Active)   Skin Assessment intact 03/27/24 1418   Compliance/Wearing Issues patient;compliant with wearing schedule 03/27/24 1418       Education  Documentation  Rehabilitation Therapy, taught by Kelly Tillman OT at 3/27/2024  2:45 PM.  Learner: Patient  Readiness: Acceptance  Method: Explanation  Response: Verbalizes Understanding  Comment: role of OT, POC, DC    Activity, taught by Kelly Tillman OT at 3/27/2024  2:45 PM.  Learner: Patient  Readiness: Acceptance  Method: Explanation  Response: Verbalizes Understanding  Comment: role of OT, POC, DC    Treatment Plan, taught by Kelly Tillman OT at 3/27/2024  2:45 PM.  Learner: Patient  Readiness: Acceptance  Method: Explanation  Response: Verbalizes Understanding  Comment: role of OT, POC, DC        Session Outcome  Patient upright, in chair at end of session, chair alarm on, all needs met, call light in reach, personal items in reach. Nursing notified about patient's performance and patient's position.    AM-PAC™ - ADL (Current Function)     Putting on/taking off regular lower body clothing 3 - A Little   Bathing 3 - A Little   Toileting 3 - A Little   Putting on/taking off regular upper body clothing 3 - A Little   Help for taking care of personal grooming 3 - A Little   Eating meals 4 - None   AM-PAC™ ADL Score 19      ASSESSMENT AND PLAN     Progress Summary  OT eval complete. Pt reports I with ADLs and ambulation PTA. Pt lives in 1SH with spouse, 1STE, tub shower+SC. Today, pt mod I for bed mobility and functional transfers. Pt performed functional ambulation short household distances with supervision. Pt educated on UBD technique post op. Pt educated on 140/40 exercises, pt verbalized understanding. Pt requesting additional OT session to review UBD, TE, and tub transfer. Rec d/c home with assistance. Continue OT during admission.    Patient/Family Therapy Goal Statement: to go home tomorrow    OT Plan      Flowsheet Row Most Recent Value   Rehab Potential good, to achieve stated therapy goals at 03/27/2024 1339   Therapy Frequency daily at 03/27/2024 1339   Planned Therapy Interventions activity  tolerance training, patient/caregiver education/training, transfer/mobility retraining, adaptive equipment training, functional balance retraining, occupation/activity based interventions, BADL retraining, ROM/therapeutic exercise, strengthening exercise at 03/27/2024 1339            OT Discharge Recommendations      Flowsheet Row Most Recent Value   OT Recommended Discharge Disposition home with assistance at 03/27/2024 1339   Anticipated Equipment Needs if Discharged Home (OT) none at 03/27/2024 1339                 OT Goals      Flowsheet Row Most Recent Value   Transfer Goal 1    Activity/Assistive Device sit-to-stand/stand-to-sit, toilet, tub at 03/27/2024 1339   [place] modified independence at 03/27/2024 1339   Time Frame by discharge at 03/27/2024 1339   Progress/Outcome goal ongoing at 03/27/2024 1339   Dressing Goal 1    Activity/Adaptive Equipment dressing skills, all at 03/27/2024 1339   [place] modified independence at 03/27/2024 1339   Time Frame by discharge at 03/27/2024 1339   Progress/Outcome goal ongoing at 03/27/2024 1339

## 2024-03-27 NOTE — OP NOTE
Operative report        Date of operation: 3/27/2024    Preoperative diagnosis: Glenohumeral arthritis right shoulder M19.011, full-thickness rotator cuff tear (upper subscapularis anterior supraspinatus) right shoulder M75.121, biceps tendinitis right shoulder M75.2.    Postoperative diagnosis: Same    Procedure: Reverse total shoulder replacement with biceps tenodesis 38727    Surgeon:  Noel Neri Jr MD    Assistant:  Candido Benjamin MD    Anesthesia:   General with block.    Estimated blood loss: 150 cc.    Complications: None    Indications : The patient is a 78-year-old female with rotator cuff tear glenohumeral arthritis and biceps tendinitis right shoulder.  She has failed nonoperative management and is brought to the operating for above procedure.    Findings: In the holding area I reexamined her and her AC joint was not tender.  Therefore we did not do is just distal clavicle excision.  The subscapularis was torn in its upper third and therefore we reflected the subscapularis off the lesser tuberosity and repaired it back to bone with 3 tapes at the end.  She did only have about 20 degrees of external rotation with her arm at her side.  Therefore, I  the capsule from subscapularis and excised the anterior inferior capsule.  The remaining capsule was released.  We placed a DJO 32-4 glenoid sphere slightly inferiorly tilted and all screws had excellent purchase.  We placed a DJO short stem small shell implant and 30 degrees of retroversion with a 32 neutral liner.  With completion of the subscapularis repair, the patient had 45 degrees of external rotation with her arm at her side and 160 degrees of elevation.  The cephalic vein was preserved and taken laterally.  We did not use a drain.       Description of procedure:  After proper written consent was obtained the patient was brought to the operating room and placed on the operating table in supine position.  After adequate anesthesia had been  obtained,, the patient's shoulder was prepped and draped in normal sterile fashion.  A 10-12 cm deltopectoral incision was made.  The incision was carried sharply down to the deep fascia.  Cephalic vein and deltoid were taken laterally while the pectoralis major was taken medially.  Clavipectoral fascia was incised just lateral to the conjoined tendon.  This incision was carried up to not into the coracoacromial ligament.  Digital palpation was used to verify the integrity of the axillary nerve, which was protected throughout the procedure.  The musculocutaneous nerve was not within our surgical field.  We retracted conjoined tendon medially and deltoid laterally.  The anterior humeral circumflex vessels were clamped and coagulated.  Biceps was uncovered from the upper border of pectoralis major to the supraglenoid tubercle and was then tenodesed to the upper border of pectoralis major with 2 Ethibond sutures.  It was then released just proximal to this tenodesis site and was then resected from the supraglenoid tubercle.  Subscapularis was  from the capsule, was tagged with two sutures and retracted medially.  The anterior capsule was released off the anatomic neck starting superiorly and extending all the way down past the 6 o'clock position, taking care to protect the axillary nerve.  This allowed us to deliver the humerus into the wound was simultaneous abduction extension and external rotation.  All humeral osteophytes were removed and the anatomic neck was marked.  The anatomic neck was then cut in 30° of retroversion leaving 2 or 3 mm of bone medial to the rotator cuff for flexion.  The humerus was then retracted posteriorly with a large Fukuda retractor.  The anterior and inferior capsule was excised and a reverse double-pronged Bankart retractor was placed on the anterior neck of the scapula.  The labrum was excised circumferentially and the remaining capsule was released.  A blunt Hohmann was  placed posterior superiorly.  I then drilled a hole for the tap approximately 15 mm superior from the most inferior extent of the native glenoid slightly inferiorly tilted in native version.  The drill exited in the appropriate location on the anterior neck of the scapula.  I then removed the drill and placed a tap to the appropriate depth.  We then reamed over the tap until we had concentric surface.  The tap was then removed.  The baseplate was then placed and had excellent purchase.  We then placed all 4 locking screws and all of them had excellent purchase before engaging the plate.  We then irrigated and dried the taper.  I then used a peripheral reamer to ream around the periphery of the baseplate and dried the taper once again.  We then impacted the glenoid sphere onto the taper.  Once it was impacted, I attempted to remove it with a small Montanez elevator and it was solidly fixed.  I hit it several more times with an impactor and mallet and then placed a central locking screw.      The humerus was then redelivered into the wound.  We reamed distally.  We then reamed the proximal epiphysis with the appropriate size reamer.  We then placed the humeral component in 30° of retroversion.  Once it was impacted into position we had excellent stability.  We then trialed the liners and picked the appropriate liner.  We reduced the humerus we had excellent stability with no impingement.  I therefore then re-deliver the humerus into the wound and removed the trial liner.  We then irrigated and dried the receptacle for the liner and impacted a real polyethylene liner into position.  Once this was completely seated, the humerus was then reduced.  Again we had excellent stability with no impingement.  Three nonabsorbable sutures were passed through bone at the lesser tuberosity. They were passed bin a grasping configuration through subscapularis and were then tied.    Digital palpation was again used to verify the integrity  of the axillary nerve.  We again copiously pulse irrigated.  The wound was then closed in standard fashion with 2-0 Vicryl in the subcutaneous tissue and running subcuticular 3-0 Monocryl for skin.  The patient was then placed in a sterile dressing and a sling.  The patient  tolerated the procedure well.       I was personally present for the key portions of the operation which included biceps tenodesis, humeral exposure and cutting, glenoid exposure, preparation, and placement of the glenoid component, evaluate to the placement of the humeral component, evaluation of stability and alignment of both components, and subscapularis repair.     Noel Neri Jr MD

## 2024-03-27 NOTE — PLAN OF CARE
Problem: Adult Inpatient Plan of Care  Goal: Plan of Care Review  Outcome: Progressing  Flowsheets (Taken 3/27/2024 2840)  Progress: improving  Outcome Evaluation: PT eval complete.  Plan of Care Reviewed With: patient

## 2024-03-27 NOTE — CONSULTS
Hospital Medicine Service -  Daily Progress Note       SUBJECTIVE   Interval History: Pt seen and examined in pacu. Denies any CP or SOB. No dizziness or lightheadedness, no N/V.     OBJECTIVE      Vital signs in last 24 hours:  Temp:  [36.5 °C (97.7 °F)-36.6 °C (97.8 °F)] 36.5 °C (97.7 °F)  Heart Rate:  [72-94] 85  Resp:  [15-20] 15  BP: (110-146)/(54-87) 110/57    Intake/Output Summary (Last 24 hours) at 3/27/2024 1213  Last data filed at 3/27/2024 0926  Gross per 24 hour   Intake 1000 ml   Output --   Net 1000 ml       PHYSICAL EXAMINATION      Physical Exam  Constitutional:       General: She is not in acute distress.     Appearance: She is not toxic-appearing.   HENT:      Head: Normocephalic and atraumatic.   Eyes:      Conjunctiva/sclera: Conjunctivae normal.   Cardiovascular:      Rate and Rhythm: Normal rate and regular rhythm.      Heart sounds: Normal heart sounds.   Pulmonary:      Effort: Pulmonary effort is normal.      Breath sounds: Normal breath sounds.   Abdominal:      General: Bowel sounds are normal. There is no distension.      Palpations: Abdomen is soft.      Tenderness: There is no abdominal tenderness.   Musculoskeletal:      Cervical back: Neck supple.      Comments: RUE in sling/immobilizer   Skin:     General: Skin is warm and dry.   Neurological:      Mental Status: She is alert and oriented to person, place, and time.   Psychiatric:         Mood and Affect: Mood normal.         Behavior: Behavior normal.            LINES, CATHETERS, DRAINS, AIRWAYS, AND WOUNDS   Lines, Drains, and Airways:  Wounds (agree with documentation and present on admission):  Peripheral IV (Adult) 03/27/24 Anterior;Left Hand (Active)   Number of days: 0       Surgical Incision Shoulder Right (Active)   Number of days: 0         Comments:      LABS / IMAGING / TELE      Labs  Preop labs reviewed      Imaging  N/a    ECG/Telemetry  NSR on tele in pacu    ASSESSMENT AND PLAN      Anemia  Assessment & Plan  Hgb  11.1 on PAT labs, appears to be chronic and stable  - Monitor CBC    Anxiety  Assessment & Plan  - Continue Lexapro     Hypothyroidism  Assessment & Plan  - Continue Levothyroxine     * S/P shoulder surgery  Assessment & Plan  s/p Right Reverse Total Shoulder Arthroplasty by Dr Neir on 3/27/24   - Management as per Ortho  - Pain control per Ortho  - DVT ppx per Ortho  - PT/OT  - Encourage IS  - Bowel regimen         VTE Assessment: Padua    VTE Prophylaxis:  Current anticoagulants:    None      Code Status: No Order      Estimated Discharge Date: 3/28/2024   Disposition Planning: per ortho recs     Klye Cooper MD  3/27/2024

## 2024-03-27 NOTE — ANESTHESIOLOGIST PRE-PROCEDURE ATTESTATION
Pre-Procedure Patient Identification:  I am the Primary Anesthesiologist and have identified the patient on 03/27/24 at 7:44 AM.   I have confirmed the procedure(s) will be performed by the following surgeon/proceduralist Noel Neri MD.

## 2024-03-27 NOTE — ANESTHESIOLOGIST PRE-PROCEDURE ATTESTATION
Pre-Procedure Patient Identification:  I am the Primary Anesthesiologist and have identified the patient on 03/27/24 at 7:53 AM.   I have confirmed the procedure(s) will be performed by the following surgeon/proceduralist Noel Neri MD.

## 2024-03-28 VITALS
TEMPERATURE: 97.7 F | DIASTOLIC BLOOD PRESSURE: 63 MMHG | BODY MASS INDEX: 40.48 KG/M2 | RESPIRATION RATE: 18 BRPM | WEIGHT: 220 LBS | HEIGHT: 62 IN | HEART RATE: 82 BPM | SYSTOLIC BLOOD PRESSURE: 140 MMHG | OXYGEN SATURATION: 96 %

## 2024-03-28 LAB
ANION GAP SERPL CALC-SCNC: 6 MEQ/L (ref 3–15)
BUN SERPL-MCNC: 18 MG/DL (ref 7–25)
CALCIUM SERPL-MCNC: 7.9 MG/DL (ref 8.6–10.3)
CHLORIDE SERPL-SCNC: 107 MEQ/L (ref 98–107)
CO2 SERPL-SCNC: 25 MEQ/L (ref 21–31)
CREAT SERPL-MCNC: 0.6 MG/DL (ref 0.6–1.2)
EGFRCR SERPLBLD CKD-EPI 2021: >60 ML/MIN/1.73M*2
ERYTHROCYTE [DISTWIDTH] IN BLOOD BY AUTOMATED COUNT: 15.7 % (ref 11.7–14.4)
ERYTHROCYTE [DISTWIDTH] IN BLOOD BY AUTOMATED COUNT: 15.7 % (ref 11.7–14.4)
GLUCOSE SERPL-MCNC: 117 MG/DL (ref 70–99)
HCT VFR BLD AUTO: 26.5 % (ref 35–45)
HCT VFR BLD AUTO: 28.4 % (ref 35–45)
HGB BLD-MCNC: 8.2 G/DL (ref 11.8–15.7)
HGB BLD-MCNC: 8.9 G/DL (ref 11.8–15.7)
MCH RBC QN AUTO: 25.5 PG (ref 28–33.2)
MCH RBC QN AUTO: 25.9 PG (ref 28–33.2)
MCHC RBC AUTO-ENTMCNC: 30.9 G/DL (ref 32.2–35.5)
MCHC RBC AUTO-ENTMCNC: 31.3 G/DL (ref 32.2–35.5)
MCV RBC AUTO: 82.6 FL (ref 83–98)
MCV RBC AUTO: 82.8 FL (ref 83–98)
PDW BLD AUTO: 10.1 FL (ref 9.4–12.3)
PDW BLD AUTO: 9.9 FL (ref 9.4–12.3)
PLATELET # BLD AUTO: 206 K/UL (ref 150–369)
PLATELET # BLD AUTO: 221 K/UL (ref 150–369)
POTASSIUM SERPL-SCNC: 4.2 MEQ/L (ref 3.5–5.1)
RBC # BLD AUTO: 3.21 M/UL (ref 3.93–5.22)
RBC # BLD AUTO: 3.43 M/UL (ref 3.93–5.22)
SODIUM SERPL-SCNC: 138 MEQ/L (ref 136–145)
WBC # BLD AUTO: 6.77 K/UL (ref 3.8–10.5)
WBC # BLD AUTO: 6.8 K/UL (ref 3.8–10.5)

## 2024-03-28 PROCEDURE — 63700000 HC SELF-ADMINISTRABLE DRUG: Performed by: NURSE PRACTITIONER

## 2024-03-28 PROCEDURE — 80048 BASIC METABOLIC PNL TOTAL CA: CPT | Performed by: NURSE PRACTITIONER

## 2024-03-28 PROCEDURE — 99232 SBSQ HOSP IP/OBS MODERATE 35: CPT | Performed by: HOSPITALIST

## 2024-03-28 PROCEDURE — 85027 COMPLETE CBC AUTOMATED: CPT | Performed by: NURSE PRACTITIONER

## 2024-03-28 PROCEDURE — 63600000 HC DRUGS/DETAIL CODE: Mod: JZ | Performed by: NURSE PRACTITIONER

## 2024-03-28 PROCEDURE — 36415 COLL VENOUS BLD VENIPUNCTURE: CPT | Performed by: NURSE PRACTITIONER

## 2024-03-28 PROCEDURE — 25800000 HC PHARMACY IV SOLUTIONS: Performed by: NURSE PRACTITIONER

## 2024-03-28 PROCEDURE — 97535 SELF CARE MNGMENT TRAINING: CPT | Mod: GO,CO

## 2024-03-28 RX ORDER — OXYCODONE HYDROCHLORIDE 5 MG/1
5 TABLET ORAL EVERY 6 HOURS PRN
Start: 2024-03-28

## 2024-03-28 RX ORDER — NAPROXEN SODIUM 220 MG/1
81 TABLET, FILM COATED ORAL DAILY
Start: 2024-03-28

## 2024-03-28 RX ORDER — ACETAMINOPHEN 500 MG
1000 TABLET ORAL
Start: 2024-03-28

## 2024-03-28 RX ADMIN — LEVOTHYROXINE SODIUM 175 MCG: 0.17 TABLET ORAL at 06:37

## 2024-03-28 RX ADMIN — ACETAMINOPHEN 650 MG: 325 TABLET ORAL at 06:40

## 2024-03-28 RX ADMIN — ESCITALOPRAM OXALATE 10 MG: 10 TABLET ORAL at 09:39

## 2024-03-28 RX ADMIN — ACETAMINOPHEN 650 MG: 325 TABLET ORAL at 00:29

## 2024-03-28 RX ADMIN — CEFAZOLIN 2 G: 2 INJECTION, POWDER, FOR SOLUTION INTRAMUSCULAR; INTRAVENOUS at 00:32

## 2024-03-28 RX ADMIN — SODIUM CHLORIDE: 9 INJECTION, SOLUTION INTRAVENOUS at 03:02

## 2024-03-28 RX ADMIN — ACETAMINOPHEN 650 MG: 325 TABLET ORAL at 12:03

## 2024-03-28 RX ADMIN — ASPIRIN 81 MG CHEWABLE TABLET 81 MG: 81 TABLET CHEWABLE at 09:39

## 2024-03-28 ASSESSMENT — COGNITIVE AND FUNCTIONAL STATUS - GENERAL
EATING MEALS: 4 - NONE
DRESSING REGULAR LOWER BODY CLOTHING: 4 - NONE
CLIMB 3 TO 5 STEPS WITH RAILING: 3 - A LITTLE
HELP NEEDED FOR BATHING: 3 - A LITTLE
WALKING IN HOSPITAL ROOM: 4 - NONE
TOILETING: 4 - NONE
MOVING TO AND FROM BED TO CHAIR: 4 - NONE
STANDING UP FROM CHAIR USING ARMS: 4 - NONE
DRESSING REGULAR UPPER BODY CLOTHING: 4 - NONE
HELP NEEDED FOR PERSONAL GROOMING: 4 - NONE

## 2024-03-28 NOTE — PLAN OF CARE
Plan of Care Review  Plan of Care Reviewed With: patient  Progress: improving  Outcome Evaluation: Pt OOB standby to BR. POLLO RUBIO, sling in use. Pain controlled with PRN tylenol. Plan to work with OT this morning then d/c home when medically cleared. Plan of care reviewed with patient at bedside.

## 2024-03-28 NOTE — PROGRESS NOTES
Occupational Therapy -  Daily Treatment/Progress Note     Patient: Iza Crenshaw  Location: [facility] 5P 5436  MRN: [id number]  Today's date: 3/28/2024    HISTORY OF PRESENT ILLNESS     Iza is a 78 y.o. 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 Principal problem is No Principal Problem: There is no principal problem currently on the Problem List. Please update the Problem List and refresh..    Past Medical History  Iza has a past medical history of Anxiety (1995), Depression, Hypothyroidism (1995), and Torn rotator cuff.    History of Present Illness   Admitted 3/27 for right Reverse total shoulder replacement with biceps tenodesis     140/40 ROM  NWB RUE  PRIOR LEVEL OF FUNCTION AND LIVING ENVIRONMENT     Prior Level of Function      Flowsheet Row Most Recent Value   Dominant Hand right   Ambulation independent   Transferring independent   Toileting independent   Bathing independent   Dressing independent   Eating independent   IADLs independent   Driving/Transportation    Communication understands/communicates without difficulty   Prior Level of Function Comment Pt reports I with ADLs and ambulation PTA   Assistive Device Currently Used at Home --  [owns SPC and walking sticks and shower chair]          Prior Living Environment      Flowsheet Row Most Recent Value   People in Home spouse   Current Living Arrangements home   Living Environment Comment 1SH with spouse, 1STE, tub shower+SC          Occupational Profile      Flowsheet Row Most Recent Value   Successful Occupations retired teacher and principal   Environmental Supports and Barriers supportive spouse          VITALS AND PAIN     OT Vitals      Date/Time Pulse HR Source SpO2 Pt Activity O2 Therapy BP Who   03/28/24 0843 85 Monitor 94 %  At rest None (Room air) 110/53 NLJ          OT Pain      Date/Time Pain Type Rating: Rest Rating: Activity [facility]   03/28/24 0843 Pain Assessment 0 4 NLJ             Objective   OBJECTIVE     Start time:  0842  End time:  0908  Session Length: 26 min  Mode of Treatment: individual therapy, occupational therapy    General Observations  Patient received in chair. She was no issues or concerns identified by nurse prior to session, agreeable to therapy.      Precautions: weight bearing, shoulder, range of motion  Extremity Weight-Bearing Status: right upper extremity  Right UE Weight-Bearing Status: non weight-bearing (NWB)           OT Eval and Treat - 03/28/24 0842          Cognition    Orientation Status oriented x 4     Follows Commands WNL        Bed Mobility    Bed Mobility Activities supine to sit;sit to supine     [place] independent     Comment No use of bed features. Able to maintain NWB of (R)UE        Mobility Belt    Mobility Belt Used for All Out of Bed Activity no     Reason Mobility Belt Not Used other (see comments)     Reason Mobility Belt Not Used (I)        Sit/Stand Transfer    Surface edge of bed;chair with arm rests     [place] independent     Transfer Comments (I) no use of AD, good safety awareness noted.        Toilet Transfer    Transfer Technique sit/stand     [place] independent     Comment (I) no use of transfer bar        Shower/Tub Transfer    Transfer Type Tub     Transfer Technique step over, right entry     [place] close supervision     Comment Simulated tub step set up within room, (S) to complete with use of wall to stabilize. Pt reporting SC at home and spouse able to assist as needed.        Functional Mobility    Distance in room/bathroom     Functional Mobility [place] independent     Functional Mobility Comments (I) within room, no LOB. Pt with goos balance/safety awareness        Upper Body Dressing    Tasks doff;don     [place] independent      Comment (I) to dress UB, able to recall technique. No concerns noted        Lower Body Dressing    Tasks don;doff;socks;underwear;pants/bottoms;shoes/slippers     [place] independent     Comment (I) with all LB dressing tasks, no concerns noted. Pt able to maintain NWB of (R)UE (I)ly        Toileting    Tasks perform bladder hygiene     [place] independent     Comment (I), pt reporting she has set up her home bathroom to accomodate (L) hand use.        Balance    Static Sitting Balance WNL     Dynamic Sitting Balance WNL     Sit to Stand Dynamic Balance WNL     Static Standing Balance WNL     Dynamic Standing Balance WNL        Upper Extremity (Therapeutic Exercise)    Exercise Position/Type PROM (passive range of motion)     Reps and Sets x10     Comment Pt with good understanding of 140/40 exercises per Dr. Neri's exercise protocol. Pt verbalized understanding and importance of adherence to promote ROM post op. exercises completed in supine.                                       Session Outcome  Patient in chair, leg(s) elevated at end of session, chair alarm on, call light in reach. Nursing notified about patient's performance.    AM-PAC™ - ADL (Current Function)     Putting on/taking off regular lower body clothing 4 - None   Bathing 3 - A Little   Toileting 4 - None   Putting on/taking off regular upper body clothing 4 - None   Help for taking care of personal grooming 4 - None   Eating meals 4 - None   AM-PAC™ ADL Score 23      ASSESSMENT AND PLAN     Progress Summary  Pt agreeable to therapy this AM. Pt functioning at an overall (I) - (S) level with all transfers/mobility and ADL tasks. Pt reporting understadning of 140/40 PROM exercise and importance of completion. Pt demonstrating good knowledge of UB/LB dressing techniques, able to miantain WNB throuhgout session (I)ly. Pt reporting her spouse is able to assist with tasks/exercises as needed. Anticiapte pt to return home once medically  cleared.    Patient/Family Therapy Goal Statement: to go home tomorrow    OT Plan      Flowsheet Row Most Recent Value   Rehab Potential good, to achieve stated therapy goals at 03/27/2024 1339   Therapy Frequency daily at 03/27/2024 1339   Planned Therapy Interventions activity tolerance training, patient/caregiver education/training, transfer/mobility retraining, adaptive equipment training, functional balance retraining, occupation/activity based interventions, BADL retraining, ROM/therapeutic exercise, strengthening exercise at 03/27/2024 1339            OT Discharge Recommendations      Flowsheet Row Most Recent Value   OT Recommended Discharge Disposition home with assistance at 03/27/2024 1339   Anticipated Equipment Needs if Discharged Home (OT) none at 03/27/2024 1339                 OT Goals      Flowsheet Row Most Recent Value   Transfer Goal 1    Activity/Assistive Device sit-to-stand/stand-to-sit, toilet, tub at 03/27/2024 1339   [place] modified independence at 03/27/2024 1339   Time Frame by discharge at 03/27/2024 1339   Progress/Outcome goal ongoing at 03/27/2024 1339   Dressing Goal 1    Activity/Adaptive Equipment dressing skills, all at 03/27/2024 1339   [place] modified independence at 03/27/2024 1339   Time Frame by discharge at 03/27/2024 1339   Progress/Outcome goal ongoing at 03/27/2024 1339

## 2024-03-28 NOTE — PROGRESS NOTES
Hospital Medicine Service -  Daily Progress Note       SUBJECTIVE   Interval History: Patient seen and examined at bedside. Denies CP, SOB, nausea, vomiting.     OBJECTIVE      Vital signs in last 24 hours:  Temp:  [36.4 °C (97.5 °F)-37.1 °C (98.8 °F)] 37.1 °C (98.8 °F)  Heart Rate:  [] 85  Resp:  [14-20] 18  BP: ()/(52-70) 110/53    Intake/Output Summary (Last 24 hours) at 3/28/2024 1034  Last data filed at 3/28/2024 0730  Gross per 24 hour   Intake 1152 ml   Output 2050 ml   Net -898 ml       PHYSICAL EXAMINATION      Physical Exam  General: NAD, calm, pleasant  HEENT: atraumatic  Cardiovascular: RRR, no murmurs; S1/S2+  Pulmonary: CTAB; no rales, rhonchi or wheezing  Gi: Soft, nontender, nondistended  Ext: RUE in sling  Neuro: Alert and oriented x3; no sensory or motor deficits  Psych: Calm, appropriate answers       LINES, CATHETERS, DRAINS, AIRWAYS, AND WOUNDS   Lines, Drains, and Airways:  Wounds (agree with documentation and present on admission):  Peripheral IV (Adult) 03/27/24 Anterior;Left Hand (Active)   Number of days: 1       Surgical Incision Shoulder Right (Active)   Number of days: 1         Comments:      LABS / IMAGING / TELE      Labs  Results from last 7 days   Lab Units 03/28/24  0234   WBC K/uL 6.80   HEMOGLOBIN g/dL 8.2*   HEMATOCRIT % 26.5*   PLATELETS K/uL 206     Results from last 7 days   Lab Units 03/28/24  0234   SODIUM mEQ/L 138   POTASSIUM mEQ/L 4.2   CHLORIDE mEQ/L 107   CO2 mEQ/L 25   BUN mg/dL 18   CREATININE mg/dL 0.6   GLUCOSE mg/dL 117*   CALCIUM mg/dL 7.9*         Imaging  I have independently reviewed the pertinent imaging from the last 24 hrs.        ASSESSMENT AND PLAN      * S/P shoulder surgery  Assessment & Plan  -s/p Right Reverse Total Shoulder Arthroplasty by Dr Neri on 3/27/24   - Management as per Ortho  - Pain control per Ortho  - DVT ppx per Ortho  - PT/OT  - Encourage IS  - Bowel regimen    Anemia  Assessment & Plan  -Acute drop expected post op  and dilutional due to IVF  - Monitor CBC    Anxiety  Assessment & Plan  - Continue Lexapro     Hypothyroidism  Assessment & Plan  - Continue Levothyroxine          VTE Assessment: Padua    VTE Prophylaxis:  Current anticoagulants:    None      Code Status: Full Code      Estimated Discharge Date: 3/28/2024     Disposition Planning: per flora Hunter DO  3/28/2024

## 2024-03-28 NOTE — PROGRESS NOTES
Orthopedic Surgery Progress Note    Interval History:  POD 1 s/p R rTSA    No acute events overnight. AFVSS. Labs stable. OOB working with PT who is recommending home with assist. Pain controlled    Vital Signs:  Vitals:    03/28/24 0232   BP: (!) 100/59   Pulse: 80   Resp: 16   Temp: 37.1 °C (98.7 °F)   SpO2: 96%       Labs:  CBC Results       03/28/24 03/18/24 08/23/17     0234 0957 1800    WBC 6.80 4.63 5.39    RBC 3.21 4.28 4.31    HGB 8.2 11.1 11.0    HCT 26.5 35.0 34.2    MCV 82.6 81.8 79.4    MCH 25.5 25.9 25.5    MCHC 30.9 31.7 32.2     241 249          BMP Results       03/28/24 03/18/24 08/23/17     0234 0957 1800     138 138    K 4.2 4.3 3.8    Cl 107 104 107    CO2 25 27 25    Glucose 117 94 130    BUN 18 18 19    Creatinine 0.6 0.6 0.7    Calcium 7.9 9.1 8.7    Anion Gap 6 7 6    EGFR >60.0 >60.0 --         Comment for K at 1800 on 08/23/17: RESULTS OBTAINED ON PLASMA. PLASMA POTASSIUM VALUES MAY BE UP TO 0.4 MEQ/L LESS THAN SERUM VALUES. THE DIFFERENCES MAY BE GREATER FOR PATIENTS WITH HIGH PLATELET OR WHITE CELL COUNTS.    Comment for EGFR at 0234 on 03/28/24: Calculation based on the Chronic Kidney Disease Epidemiology Collaboration (CKD-EPI) equation refit without adjustment for race.    Comment for EGFR at 0957 on 03/18/24: Calculation based on the Chronic Kidney Disease Epidemiology Collaboration (CKD-EPI) equation refit without adjustment for race.          Physical Exam:  General:  Awake, following commands  Symmetric chest rise bilaterally with normal effort    Musculoskeletal:  Right Upper Extremity:  Dressings clean, dry, intact  Fires deltoid, biceps, triceps, wrist extensors, finger flexors, hand intrinsics  Sensation intact to light touch in axillary, median, radial, ulnar nerve distributions  Palpable radial pulse  Compartments soft and compressible          Assessment & Plan:  78 y.o. female s/p R rTSA on 3/27/24    - POD: 1 Day Post-Op  - Weight bearing status: NWB RUE in  ultrasling   - DVT prophylaxis: ASA 81 daily   - Analgesia  - PT/OT    Sherif Schaefer MD  Orthopedic Surgery

## 2024-03-28 NOTE — PLAN OF CARE
Plan of Care Review  Plan of Care Reviewed With: patient  Progress: improving  Outcome Evaluation: Patient OOB w/ supervision. Cleared PT/OT. Minimal c/o pain, controlled by PRN Tylenol. Cleared for DC home w/ .

## 2024-03-28 NOTE — PLAN OF CARE
Care Coordination Discharge Plan Summary    Admission Assessment Summary    General Information  Readmission Within the last 30 days: no previous admission in last 30 days  Does patient have a : No  Patient-Specific Goals (include timeframe): to safely DC to home with assist from     Living Arrangements  Arrived From: home  Current Living Arrangements: home  People in Home: spouse  Home Accessibility: stairs to enter home (Group), stairs within home (Group)  Living Arrangement Comments: Pt lives with her  in [place] with [place].    Social Determinants of Health - Screenings  Housing Stability  In the last 12 months, was there a time when you were not able to pay the mortgage or rent on time?: No  In the last 12 months, how many places have you lived?: 1  In the last 12 months, was there a time when you did not have a steady place to sleep or slept in a shelter (including now)?: No  Utility Access  In the past 12 months has the electric, gas, oil, or water company threatened to shut off services in your home?: No  Transportation Needs  In the past 12 months, has lack of transportation kept you from medical appointments or from getting medications?: No  In the past 12 months, has lack of transportation kept you from meetings, work, or from getting things needed for daily living?: No    Functional Status Prior to Admission  Assistive Device/Animal Currently Used at Home: none  Functional Status Comments: Pt uses no AD to ambulate at baseline.  IADL Comments: IND with ADLs and IADLs    Discharge Needs Assessment    Concerns to be Addressed: denies needs/concerns at this time, no discharge needs identified  Current Discharge Risk:    Anticipated Changes Related to Illness: none    Discharge Plan Summary    Patient Choice  Offered/Gave Vendor List: no       Concerns / Comments:      Discharge Plan:  Disposition/Destination:   /         Connection to Community  Not applicable  Community Resources:       Discharge Transportation:  Is Out of Hospital DNR needed at Discharge: no  Does patient need discharge transport?  No,  will transport    DC Plans  Home with assist  Family will transport

## 2024-09-16 ENCOUNTER — TRANSCRIBE ORDERS (OUTPATIENT)
Dept: SCHEDULING | Age: 79
End: 2024-09-16

## 2024-09-16 DIAGNOSIS — M19.071 PRIMARY OSTEOARTHRITIS, RIGHT ANKLE AND FOOT: Primary | ICD-10-CM

## 2024-09-16 DIAGNOSIS — M76.71 PERONEAL TENDINITIS, RIGHT LEG: ICD-10-CM

## 2024-09-16 DIAGNOSIS — M25.371 OTHER INSTABILITY, RIGHT ANKLE: ICD-10-CM

## 2024-09-28 ENCOUNTER — HOSPITAL ENCOUNTER (OUTPATIENT)
Dept: RADIOLOGY | Age: 79
Discharge: HOME | End: 2024-09-28
Attending: PODIATRIST
Payer: MEDICARE

## 2024-09-28 DIAGNOSIS — M25.371 OTHER INSTABILITY, RIGHT ANKLE: ICD-10-CM

## 2024-09-28 DIAGNOSIS — M76.71 PERONEAL TENDINITIS, RIGHT LEG: ICD-10-CM

## 2024-09-28 DIAGNOSIS — M19.071 PRIMARY OSTEOARTHRITIS, RIGHT ANKLE AND FOOT: ICD-10-CM

## (undated) DEVICE — APPLICATOR CHLORAPREP 26ML ORANGE TINT

## (undated) DEVICE — GOWN SIRUS FABRNF RAGLAN XL ST 28/CS

## (undated) DEVICE — BULB SYRINGE IRRIGATION STERILE

## (undated) DEVICE — SUTURE POLYSORB 2-0 UNDYED 1X30 P-17

## (undated) DEVICE — TIP SUCTION FRAZIER 12FR

## (undated) DEVICE — GLOVE SZ 8 LINER PROTEXIS PI BL

## (undated) DEVICE — HOOD T7 PLUS

## (undated) DEVICE — SUCTION YANKAUER CLR BULB TIP

## (undated) DEVICE — NEEDLE DISP SPINAL 18GX3 1/2

## (undated) DEVICE — GLOVE SZ 8 PROTEXIS PI

## (undated) DEVICE — COVER TABLE 44X90 ST 22/CS

## (undated) DEVICE — FACEMASK FOR SHOULDER POSITONER

## (undated) DEVICE — PACK MLHS SHOULDER PACK RFID STDZ

## (undated) DEVICE — SHEET DRAPE 3/4 REVERSEFOLD 53X77

## (undated) DEVICE — DRAPE-U-1015

## (undated) DEVICE — BLADE SCALPEL #15

## (undated) DEVICE — SLEEVE SCD COMFORT KNEE LENGTH MED

## (undated) DEVICE — SUTURE MONOCRYL 3-0  Y497G

## (undated) DEVICE — SET HANDPIECE INTERPULSE

## (undated) DEVICE — TUBING SMOKE EVAC PENCIL COATED

## (undated) DEVICE — DRAPE POUCH IRRIGATION

## (undated) DEVICE — SOLN IRRIG STER WATER 1000ML

## (undated) DEVICE — ADHESIVE SKIN DERMABOND ADVANCED 0.7ML

## (undated) DEVICE — VEST STERILE

## (undated) DEVICE — BLANKET UPPER BODY BAIR HUGGER

## (undated) DEVICE — MIX-EVAC HIGH VACUUM KIT

## (undated) DEVICE — DRAPE IOBAN

## (undated) DEVICE — MANIFOLD FOUR PORT NEPTUNE

## (undated) DEVICE — DRAPE 3/4 REINFORCED

## (undated) DEVICE — MAT ABSORBANT SUPERMAT 50 X 56

## (undated) DEVICE — NEEDLE HALF CIRCLE TAPER MED GALLES FASCIA

## (undated) DEVICE — TIP SUCTION YANKAUER

## (undated) DEVICE — SUTURETAPE 1.3MM SUTURE W NEEDLE

## (undated) DEVICE — COVER LIGHTHANDLE (STERILE SINGLE PA

## (undated) DEVICE — SOLN IRRIG .9%SOD 3L

## (undated) DEVICE — SUTURE POLYSORB 1 UNDYED 1X30 GS-12

## (undated) DEVICE — SPONGE LAP 18X18 SAFE-T RFID ENHANCED XRAY

## (undated) DEVICE — PAD GROUND ELECTROSURGICAL W/CORD

## (undated) DEVICE — TOWEL SURGICAL W17XL27IN BLUE COTTON STANDARD PREWASHED DELI

## (undated) DEVICE — GAUZE 8X4 16 PLY RFID DOUBLE XRAY

## (undated) DEVICE — SUTURE TICRON 5 BLUE 5X30 SCC